# Patient Record
Sex: MALE | Race: WHITE | URBAN - METROPOLITAN AREA
[De-identification: names, ages, dates, MRNs, and addresses within clinical notes are randomized per-mention and may not be internally consistent; named-entity substitution may affect disease eponyms.]

---

## 2023-06-26 VITALS
SYSTOLIC BLOOD PRESSURE: 104 MMHG | OXYGEN SATURATION: 96 % | DIASTOLIC BLOOD PRESSURE: 44 MMHG | RESPIRATION RATE: 14 BRPM | RESPIRATION RATE: 6 BRPM | WEIGHT: 203 LBS | DIASTOLIC BLOOD PRESSURE: 92 MMHG | DIASTOLIC BLOOD PRESSURE: 67 MMHG | DIASTOLIC BLOOD PRESSURE: 67 MMHG | DIASTOLIC BLOOD PRESSURE: 73 MMHG | HEART RATE: 58 BPM | SYSTOLIC BLOOD PRESSURE: 104 MMHG | TEMPERATURE: 98.6 F | HEART RATE: 63 BPM | OXYGEN SATURATION: 98 % | TEMPERATURE: 98.6 F | OXYGEN SATURATION: 99 % | SYSTOLIC BLOOD PRESSURE: 141 MMHG | HEIGHT: 72 IN | RESPIRATION RATE: 21 BRPM | RESPIRATION RATE: 14 BRPM | SYSTOLIC BLOOD PRESSURE: 148 MMHG | DIASTOLIC BLOOD PRESSURE: 92 MMHG | DIASTOLIC BLOOD PRESSURE: 73 MMHG | SYSTOLIC BLOOD PRESSURE: 148 MMHG | DIASTOLIC BLOOD PRESSURE: 73 MMHG | RESPIRATION RATE: 13 BRPM | OXYGEN SATURATION: 96 % | DIASTOLIC BLOOD PRESSURE: 44 MMHG | DIASTOLIC BLOOD PRESSURE: 76 MMHG | DIASTOLIC BLOOD PRESSURE: 44 MMHG | SYSTOLIC BLOOD PRESSURE: 111 MMHG | OXYGEN SATURATION: 98 % | DIASTOLIC BLOOD PRESSURE: 86 MMHG | HEART RATE: 64 BPM | HEIGHT: 72 IN | HEART RATE: 58 BPM | RESPIRATION RATE: 6 BRPM | SYSTOLIC BLOOD PRESSURE: 111 MMHG | RESPIRATION RATE: 6 BRPM | DIASTOLIC BLOOD PRESSURE: 69 MMHG | HEART RATE: 72 BPM | OXYGEN SATURATION: 98 % | DIASTOLIC BLOOD PRESSURE: 76 MMHG | RESPIRATION RATE: 21 BRPM | RESPIRATION RATE: 13 BRPM | HEART RATE: 61 BPM | SYSTOLIC BLOOD PRESSURE: 104 MMHG | RESPIRATION RATE: 14 BRPM | HEART RATE: 58 BPM | DIASTOLIC BLOOD PRESSURE: 69 MMHG | SYSTOLIC BLOOD PRESSURE: 118 MMHG | SYSTOLIC BLOOD PRESSURE: 109 MMHG | SYSTOLIC BLOOD PRESSURE: 118 MMHG | HEART RATE: 63 BPM | HEART RATE: 61 BPM | RESPIRATION RATE: 13 BRPM | WEIGHT: 203 LBS | SYSTOLIC BLOOD PRESSURE: 141 MMHG | SYSTOLIC BLOOD PRESSURE: 109 MMHG | HEART RATE: 67 BPM | HEART RATE: 63 BPM | OXYGEN SATURATION: 99 % | SYSTOLIC BLOOD PRESSURE: 109 MMHG | DIASTOLIC BLOOD PRESSURE: 67 MMHG | DIASTOLIC BLOOD PRESSURE: 69 MMHG | SYSTOLIC BLOOD PRESSURE: 111 MMHG | OXYGEN SATURATION: 92 % | OXYGEN SATURATION: 92 % | HEART RATE: 61 BPM | HEART RATE: 72 BPM | RESPIRATION RATE: 21 BRPM | HEIGHT: 72 IN | DIASTOLIC BLOOD PRESSURE: 86 MMHG | HEART RATE: 67 BPM | DIASTOLIC BLOOD PRESSURE: 92 MMHG | DIASTOLIC BLOOD PRESSURE: 76 MMHG | TEMPERATURE: 98.6 F | HEART RATE: 67 BPM | DIASTOLIC BLOOD PRESSURE: 86 MMHG | HEART RATE: 72 BPM | HEART RATE: 64 BPM | SYSTOLIC BLOOD PRESSURE: 148 MMHG | SYSTOLIC BLOOD PRESSURE: 141 MMHG | OXYGEN SATURATION: 99 % | HEART RATE: 64 BPM | WEIGHT: 203 LBS | SYSTOLIC BLOOD PRESSURE: 118 MMHG | OXYGEN SATURATION: 92 % | OXYGEN SATURATION: 96 %

## 2023-06-28 ENCOUNTER — OFFICE (OUTPATIENT)
Dept: URBAN - METROPOLITAN AREA PATHOLOGY 2 | Facility: PATHOLOGY | Age: 63
End: 2023-06-28
Payer: COMMERCIAL

## 2023-06-28 ENCOUNTER — AMBULATORY SURGICAL CENTER (OUTPATIENT)
Dept: URBAN - METROPOLITAN AREA SURGERY 12 | Facility: SURGERY | Age: 63
End: 2023-06-28
Payer: COMMERCIAL

## 2023-06-28 DIAGNOSIS — Z85.72 PERSONAL HISTORY OF NON-HODGKIN LYMPHOMAS: ICD-10-CM

## 2023-06-28 DIAGNOSIS — K21.00 GASTRO-ESOPHAGEAL REFLUX DISEASE WITH ESOPHAGITIS, WITHOUT B: ICD-10-CM

## 2023-06-28 PROBLEM — K22.89 OTHER SPECIFIED DISEASE OF ESOPHAGUS: Status: ACTIVE | Noted: 2023-06-28

## 2023-06-28 PROCEDURE — 88313 SPECIAL STAINS GROUP 2: CPT | Performed by: PATHOLOGY

## 2023-06-28 PROCEDURE — 43239 EGD BIOPSY SINGLE/MULTIPLE: CPT | Performed by: INTERNAL MEDICINE

## 2023-06-28 PROCEDURE — 88305 TISSUE EXAM BY PATHOLOGIST: CPT | Performed by: PATHOLOGY

## 2023-06-28 PROCEDURE — 88342 IMHCHEM/IMCYTCHM 1ST ANTB: CPT | Performed by: PATHOLOGY

## 2023-07-14 LAB
ALANINE AMINOTRANSFERASE (SGPT) (U/L) IN SER/PLAS: 42 U/L (ref 10–52)
ALBUMIN (G/DL) IN SER/PLAS: 4.2 G/DL (ref 3.4–5)
ALKALINE PHOSPHATASE (U/L) IN SER/PLAS: 85 U/L (ref 33–136)
ANION GAP IN SER/PLAS: 12 MMOL/L (ref 10–20)
ASPARTATE AMINOTRANSFERASE (SGOT) (U/L) IN SER/PLAS: 37 U/L (ref 9–39)
BASOPHILS (10*3/UL) IN BLOOD BY AUTOMATED COUNT: 0.04 X10E9/L (ref 0–0.1)
BASOPHILS/100 LEUKOCYTES IN BLOOD BY AUTOMATED COUNT: 0.7 % (ref 0–2)
BILIRUBIN TOTAL (MG/DL) IN SER/PLAS: 0.6 MG/DL (ref 0–1.2)
CALCIUM (MG/DL) IN SER/PLAS: 9.1 MG/DL (ref 8.6–10.6)
CARBON DIOXIDE, TOTAL (MMOL/L) IN SER/PLAS: 30 MMOL/L (ref 21–32)
CHLORIDE (MMOL/L) IN SER/PLAS: 107 MMOL/L (ref 98–107)
CREATININE (MG/DL) IN SER/PLAS: 1.1 MG/DL (ref 0.5–1.3)
EOSINOPHILS (10*3/UL) IN BLOOD BY AUTOMATED COUNT: 0.28 X10E9/L (ref 0–0.7)
EOSINOPHILS/100 LEUKOCYTES IN BLOOD BY AUTOMATED COUNT: 4.7 % (ref 0–6)
ERYTHROCYTE DISTRIBUTION WIDTH (RATIO) BY AUTOMATED COUNT: 13.2 % (ref 11.5–14.5)
ERYTHROCYTE MEAN CORPUSCULAR HEMOGLOBIN CONCENTRATION (G/DL) BY AUTOMATED: 32.1 G/DL (ref 32–36)
ERYTHROCYTE MEAN CORPUSCULAR VOLUME (FL) BY AUTOMATED COUNT: 94 FL (ref 80–100)
ERYTHROCYTES (10*6/UL) IN BLOOD BY AUTOMATED COUNT: 5.11 X10E12/L (ref 4.5–5.9)
FERRITIN (UG/LL) IN SER/PLAS: 125 UG/L (ref 20–300)
GFR MALE: 76 ML/MIN/1.73M2
GLUCOSE (MG/DL) IN SER/PLAS: 104 MG/DL (ref 74–99)
HEMATOCRIT (%) IN BLOOD BY AUTOMATED COUNT: 48 % (ref 41–52)
HEMOGLOBIN (G/DL) IN BLOOD: 15.4 G/DL (ref 13.5–17.5)
IMMATURE GRANULOCYTES/100 LEUKOCYTES IN BLOOD BY AUTOMATED COUNT: 0.5 % (ref 0–0.9)
IRON (UG/DL) IN SER/PLAS: 67 UG/DL (ref 35–150)
IRON BINDING CAPACITY (UG/DL) IN SER/PLAS: 348 UG/DL (ref 240–445)
IRON SATURATION (%) IN SER/PLAS: 19 % (ref 25–45)
LACTATE (MMOL/L) IN SER/PLAS: 3.1 MMOL/L (ref 0.4–2)
LEUKOCYTES (10*3/UL) IN BLOOD BY AUTOMATED COUNT: 6 X10E9/L (ref 4.4–11.3)
LYMPHOCYTES (10*3/UL) IN BLOOD BY AUTOMATED COUNT: 0.97 X10E9/L (ref 1.2–4.8)
LYMPHOCYTES/100 LEUKOCYTES IN BLOOD BY AUTOMATED COUNT: 16.2 % (ref 13–44)
MONOCYTES (10*3/UL) IN BLOOD BY AUTOMATED COUNT: 0.7 X10E9/L (ref 0.1–1)
MONOCYTES/100 LEUKOCYTES IN BLOOD BY AUTOMATED COUNT: 11.7 % (ref 2–10)
NEUTROPHILS (10*3/UL) IN BLOOD BY AUTOMATED COUNT: 3.96 X10E9/L (ref 1.2–7.7)
NEUTROPHILS/100 LEUKOCYTES IN BLOOD BY AUTOMATED COUNT: 66.2 % (ref 40–80)
NRBC (PER 100 WBCS) BY AUTOMATED COUNT: 0 /100 WBC (ref 0–0)
PLATELETS (10*3/UL) IN BLOOD AUTOMATED COUNT: 221 X10E9/L (ref 150–450)
POTASSIUM (MMOL/L) IN SER/PLAS: 4.5 MMOL/L (ref 3.5–5.3)
PROTEIN TOTAL: 6.5 G/DL (ref 6.4–8.2)
SODIUM (MMOL/L) IN SER/PLAS: 144 MMOL/L (ref 136–145)
UREA NITROGEN (MG/DL) IN SER/PLAS: 23 MG/DL (ref 6–23)

## 2023-09-17 PROBLEM — R35.1 NOCTURIA: Status: ACTIVE | Noted: 2023-09-17

## 2023-09-17 PROBLEM — E89.0 HYPOTHYROIDISM, POSTSURGICAL: Status: ACTIVE | Noted: 2023-09-17

## 2023-09-17 PROBLEM — I10 HIGH BLOOD PRESSURE: Status: ACTIVE | Noted: 2023-09-17

## 2023-09-17 PROBLEM — R23.2 HOT FLASHES: Status: ACTIVE | Noted: 2023-09-17

## 2023-09-17 RX ORDER — VALSARTAN 160 MG/1
1 TABLET ORAL DAILY
COMMUNITY
End: 2023-10-31 | Stop reason: SDUPTHER

## 2023-09-17 RX ORDER — LEVOTHYROXINE SODIUM 137 UG/1
1 TABLET ORAL DAILY
COMMUNITY
Start: 2014-11-06 | End: 2023-11-03 | Stop reason: SDUPTHER

## 2023-10-12 ENCOUNTER — HOSPITAL ENCOUNTER (OUTPATIENT)
Dept: RADIOLOGY | Facility: CLINIC | Age: 63
Discharge: HOME | End: 2023-10-12
Payer: COMMERCIAL

## 2023-10-12 DIAGNOSIS — R94.02 ABNORMAL BRAIN SCAN: ICD-10-CM

## 2023-10-12 DIAGNOSIS — C88.4 EXTRANODAL MARGINAL ZONE B-CELL LYMPHOMA OF MUCOSA-ASSOCIATED LYMPHOID TISSUE (MALT-LYMPHOMA) (MULTI): ICD-10-CM

## 2023-10-12 PROCEDURE — 3430000001 HC RX 343 DIAGNOSTIC RADIOPHARMACEUTICALS: Performed by: INTERNAL MEDICINE

## 2023-10-12 PROCEDURE — 78815 PET IMAGE W/CT SKULL-THIGH: CPT | Mod: PET TUMOR SUBSQ TX STRATEGY | Performed by: NUCLEAR MEDICINE

## 2023-10-12 PROCEDURE — A9552 F18 FDG: HCPCS | Performed by: INTERNAL MEDICINE

## 2023-10-12 PROCEDURE — 78815 PET IMAGE W/CT SKULL-THIGH: CPT | Mod: PS

## 2023-10-12 PROCEDURE — 3430000001 HC RX 343 DIAGNOSTIC RADIOPHARMACEUTICALS: Mod: MUE

## 2023-10-12 PROCEDURE — A9552 F18 FDG: HCPCS | Mod: MUE

## 2023-10-12 RX ORDER — FLUDEOXYGLUCOSE F 18 200 MCI/ML
14.3 INJECTION, SOLUTION INTRAVENOUS
Status: COMPLETED | OUTPATIENT
Start: 2023-10-12 | End: 2023-10-12

## 2023-10-12 RX ADMIN — FLUDEOXYGLUCOSE F 18 14.3 MILLICURIE: 200 INJECTION, SOLUTION INTRAVENOUS at 08:28

## 2023-10-13 ASSESSMENT — PATIENT HEALTH QUESTIONNAIRE - PHQ9
5. POOR APPETITE OR OVEREATING: NOT AT ALL
4. FEELING TIRED OR HAVING LITTLE ENERGY: NOT AT ALL
1. LITTLE INTEREST OR PLEASURE IN DOING THINGS: NOT AT ALL
10. IF YOU CHECKED OFF ANY PROBLEMS, HOW DIFFICULT HAVE THESE PROBLEMS MADE IT FOR YOU TO DO YOUR WORK, TAKE CARE OF THINGS AT HOME, OR GET ALONG WITH OTHER PEOPLE: NOT DIFFICULT AT ALL
2. FEELING DOWN, DEPRESSED, IRRITABLE, OR HOPELESS: NOT AT ALL
3. TROUBLE FALLING OR STAYING ASLEEP OR SLEEPING TOO MUCH: NOT AT ALL
4. FEELING TIRED OR HAVING LITTLE ENERGY: NOT AT ALL
9. THOUGHTS THAT YOU WOULD BE BETTER OFF DEAD, OR OF HURTING YOURSELF: NOT AT ALL
10. IF YOU CHECKED OFF ANY PROBLEMS, HOW DIFFICULT HAVE THESE PROBLEMS MADE IT FOR YOU TO DO YOUR WORK, TAKE CARE OF THINGS AT HOME, OR GET ALONG WITH OTHER PEOPLE: NOT DIFFICULT AT ALL
8. MOVING OR SPEAKING SO SLOWLY THAT OTHER PEOPLE COULD HAVE NOTICED. OR THE OPPOSITE, BEING SO FIGETY OR RESTLESS THAT YOU HAVE BEEN MOVING AROUND A LOT MORE THAN USUAL: NOT AT ALL
2. FEELING DOWN, DEPRESSED, IRRITABLE, OR HOPELESS: 0
9. THOUGHTS THAT YOU WOULD BE BETTER OFF DEAD, OR OF HURTING YOURSELF: 0
3. TROUBLE FALLING OR STAYING ASLEEP OR SLEEPING TOO MUCH: NOT AT ALL
6. FEELING BAD ABOUT YOURSELF - OR THAT YOU ARE A FAILURE OR HAVE LET YOURSELF OR YOUR FAMILY DOWN: 0
8. MOVING OR SPEAKING SO SLOWLY THAT OTHER PEOPLE COULD HAVE NOTICED. OR THE OPPOSITE, BEING SO FIGETY OR RESTLESS THAT YOU HAVE BEEN MOVING AROUND A LOT MORE THAN USUAL: 0
8. MOVING OR SPEAKING SO SLOWLY THAT OTHER PEOPLE COULD HAVE NOTICED. OR THE OPPOSITE, BEING SO FIGETY OR RESTLESS THAT YOU HAVE BEEN MOVING AROUND A LOT MORE THAN USUAL: NOT AT ALL
1. LITTLE INTEREST OR PLEASURE IN DOING THINGS: 0
1. LITTLE INTEREST OR PLEASURE IN DOING THINGS: NOT AT ALL
6. FEELING BAD ABOUT YOURSELF - OR THAT YOU ARE A FAILURE OR HAVE LET YOURSELF OR YOUR FAMILY DOWN: NOT AT ALL
4. FEELING TIRED OR HAVING LITTLE ENERGY: 0
2. FEELING DOWN, DEPRESSED OR HOPELESS: NOT AT ALL
5. POOR APPETITE OR OVEREATING: 0
5. POOR APPETITE OR OVEREATING: NOT AT ALL
7. TROUBLE CONCENTRATING ON THINGS, SUCH AS READING THE NEWSPAPER OR WATCHING TELEVISION: 0
9. THOUGHTS THAT YOU WOULD BE BETTER OFF DEAD, OR OF HURTING YOURSELF: NOT AT ALL
SUM OF ALL RESPONSES TO PHQ QUESTIONS 1-9: 0
3. TROUBLE FALLING OR STAYING ASLEEP OR SLEEPING TOO MUCH: 0
7. TROUBLE CONCENTRATING ON THINGS, SUCH AS READING THE NEWSPAPER OR WATCHING TELEVISION: NOT AT ALL
SUM OF ALL RESPONSES TO PHQ QUESTIONS 1-9: 0
7. TROUBLE CONCENTRATING ON THINGS, SUCH AS READING THE NEWSPAPER OR WATCHING TELEVISION: NOT AT ALL
6. FEELING BAD ABOUT YOURSELF - OR THAT YOU ARE A FAILURE OR HAVE LET YOURSELF OR YOUR FAMILY DOWN: NOT AT ALL

## 2023-10-17 ENCOUNTER — OFFICE VISIT (OUTPATIENT)
Dept: HEMATOLOGY/ONCOLOGY | Facility: CLINIC | Age: 63
End: 2023-10-17
Payer: COMMERCIAL

## 2023-10-17 ENCOUNTER — APPOINTMENT (OUTPATIENT)
Dept: LAB | Facility: CLINIC | Age: 63
End: 2023-10-17
Payer: COMMERCIAL

## 2023-10-17 ENCOUNTER — DOCUMENTATION (OUTPATIENT)
Dept: HEMATOLOGY/ONCOLOGY | Facility: CLINIC | Age: 63
End: 2023-10-17

## 2023-10-17 VITALS
DIASTOLIC BLOOD PRESSURE: 97 MMHG | OXYGEN SATURATION: 96 % | HEIGHT: 72 IN | BODY MASS INDEX: 28.67 KG/M2 | HEART RATE: 55 BPM | SYSTOLIC BLOOD PRESSURE: 145 MMHG | TEMPERATURE: 97.3 F | RESPIRATION RATE: 18 BRPM | WEIGHT: 211.64 LBS

## 2023-10-17 DIAGNOSIS — E89.0 HYPOTHYROIDISM, POSTSURGICAL: ICD-10-CM

## 2023-10-17 DIAGNOSIS — C85.99 GASTRIC LYMPHOMA (MULTI): Primary | ICD-10-CM

## 2023-10-17 LAB
BASOPHILS # BLD AUTO: 0.02 X10*3/UL (ref 0–0.1)
BASOPHILS NFR BLD AUTO: 0.4 %
EOSINOPHIL # BLD AUTO: 0.19 X10*3/UL (ref 0–0.7)
EOSINOPHIL NFR BLD AUTO: 3.3 %
ERYTHROCYTE [DISTWIDTH] IN BLOOD BY AUTOMATED COUNT: 13 % (ref 11.5–14.5)
HCT VFR BLD AUTO: 46.7 % (ref 41–52)
HGB BLD-MCNC: 15.5 G/DL (ref 13.5–17.5)
IMM GRANULOCYTES # BLD AUTO: 0.02 X10*3/UL (ref 0–0.7)
IMM GRANULOCYTES NFR BLD AUTO: 0.4 % (ref 0–0.9)
LDH SERPL L TO P-CCNC: 152 U/L (ref 84–246)
LYMPHOCYTES # BLD AUTO: 1.1 X10*3/UL (ref 1.2–4.8)
LYMPHOCYTES NFR BLD AUTO: 19.3 %
MCH RBC QN AUTO: 31 PG (ref 26–34)
MCHC RBC AUTO-ENTMCNC: 33.2 G/DL (ref 32–36)
MCV RBC AUTO: 93 FL (ref 80–100)
MONOCYTES # BLD AUTO: 0.73 X10*3/UL (ref 0.1–1)
MONOCYTES NFR BLD AUTO: 12.8 %
NEUTROPHILS # BLD AUTO: 3.63 X10*3/UL (ref 1.2–7.7)
NEUTROPHILS NFR BLD AUTO: 63.8 %
NRBC BLD-RTO: ABNORMAL /100{WBCS}
PLATELET # BLD AUTO: 214 X10*3/UL (ref 150–450)
PMV BLD AUTO: 9.2 FL (ref 7.5–11.5)
RBC # BLD AUTO: 5 X10*6/UL (ref 4.5–5.9)
WBC # BLD AUTO: 5.7 X10*3/UL (ref 4.4–11.3)

## 2023-10-17 PROCEDURE — 3080F DIAST BP >= 90 MM HG: CPT | Performed by: INTERNAL MEDICINE

## 2023-10-17 PROCEDURE — 80053 COMPREHEN METABOLIC PANEL: CPT | Mod: CMCLAB | Performed by: INTERNAL MEDICINE

## 2023-10-17 PROCEDURE — 85025 COMPLETE CBC W/AUTO DIFF WBC: CPT | Performed by: INTERNAL MEDICINE

## 2023-10-17 PROCEDURE — 99214 OFFICE O/P EST MOD 30 MIN: CPT | Performed by: INTERNAL MEDICINE

## 2023-10-17 PROCEDURE — 36415 COLL VENOUS BLD VENIPUNCTURE: CPT | Performed by: INTERNAL MEDICINE

## 2023-10-17 PROCEDURE — 3077F SYST BP >= 140 MM HG: CPT | Performed by: INTERNAL MEDICINE

## 2023-10-17 PROCEDURE — 83615 LACTATE (LD) (LDH) ENZYME: CPT | Performed by: INTERNAL MEDICINE

## 2023-10-17 PROCEDURE — 1036F TOBACCO NON-USER: CPT | Performed by: INTERNAL MEDICINE

## 2023-10-17 PROCEDURE — 83615 LACTATE (LD) (LDH) ENZYME: CPT | Mod: CMCLAB | Performed by: INTERNAL MEDICINE

## 2023-10-17 RX ORDER — GLUCOSAMINE/CHONDRO SU A 500-400 MG
TABLET ORAL DAILY
COMMUNITY
End: 2023-10-31 | Stop reason: ALTCHOICE

## 2023-10-17 RX ORDER — BISMUTH SUBSALICYLATE 262 MG
1 TABLET,CHEWABLE ORAL DAILY
COMMUNITY

## 2023-10-17 RX ORDER — GLUCOSAM/CHONDRO/HERB 149/HYAL 750-100 MG
TABLET ORAL
COMMUNITY

## 2023-10-17 ASSESSMENT — PAIN SCALES - GENERAL: PAINLEVEL: 0-NO PAIN

## 2023-10-17 NOTE — PROGRESS NOTES
ASSESSMENT, PROBLEM LIST, DECISION MAKING, PLAN.    MALT lymphoma of stomach, diagnosed in January 2023, stage I E  EGD by Dr. Lucio Mendez showed 2 cm hiatal hernia moderate erythema of the body and several 2 mm erosion which was biopsied and showed atypical lymphoid infiltrate suggestive of MALT lymphoma,, H. pylori negative.  Lymphocytes were CD20 positive, Bcl-2 positive and coexpression of CD43, repeat H. pylori was negative as well   Additional studies for B-cell gene rearrangement and FISH analysis for t(11:18) translocation may be helpful so specimen has been sent to [facility].    Bone marrow aspirate / biopsy is negative At [facility], PET scan showed only activity in the stomach and additional activity in the right palatine tonsil of unclear etiology.    Patient had right tonsil biopsy showed focal chronic inflammation, no evidence of malignancy, right base of the tongue biopsy showed some chronic inflammation but no malignancy, by Dr. Jose Logan on 8 March 2023.      Patient had radiation to stomach started on 1 March 2023 and ended on 20 March 2023, he had 14 treatment with total dose of 25.2 Gy under care of Dr. Pagan at [facility]    Patient had a follow-up EGD by Dr. Lucio Mendez on 28 June 2023 and had normal stomach at that time.  Net PET was negative in October 2023      PAST MEDICAL HISTORY:     hypertension, hypothyroidism post thyroidectomy 15 years prior      INTERVAL HISTORY :   Patient returns today for follow-up after patient finished radiation therapy for MALT lymphoma of the stomach in March 2023  Patient had a follow-up EGD by Dr. Lucio Mendez on 28 June 2023 and had normal stomach at that time.  Patient had a net PET done and now returns today for follow-up and review of testing.  Patient is clinically asymptomatic denies any fever weight loss night sweats or abdominal pain.  Denies any new lymphadenopathy.      PHYSICAL EXAM:  General: Conscious, alert, oriented x3,  not in acute distress.  HEENT:    No icterus.    Chest:Bilateral symmetrical,     CVS:  S1, S2.    Abdomen:  Soft, no palpable mass   Extremities: No clubbing, cyanosis,     Skin: No petechial rash.        Patient had net PET done on 12 October 2023 showed no abnormal activity suggestive of recurrence or persistent disease.      ASSESSMENT & PLAN:    Patient  with H. pylori negative MALT lymphoma involving stomach stage I E.  Staging PET scan was independently reviewed and reviewed with the patient and wife shows heterogeneous activity in the stomach, no other distant lymphadenopathy noted except borderline activity on right palatine tonsil, on physical exam he has mild erythema in his throat but otherwise I do not see any obvious mass.  Could very well be reactive versus second primary and less likely related to his MALT lymphoma.  Bone marrow biopsy is negative for involvement with lymphoma.       There was a second opinion from [facility] pathology department and B-cell gene rearrangement was also positive, consistent with B-cell lymphoma       Patient had right tonsil biopsy showed focal chronic inflammation, no evidence of malignancy, right base of the tongue biopsy showed some chronic inflammation but no malignancy, by Dr. Jose Logan on 8 March 2023.      Patient had radiation to stomach started on 1 March 2023 and ended on 20 March 2023, he had 14 treatment with total dose of 25.2 [place] under care of Dr. Pagan at [facility]  Patient had EGD on June 28, 2023 by Dr. Lucio Mendez and endoscopically there was no mass identified.        Net PET is negative in October 2023,  Clinically no evidence of recurrence, and he does not have any B symptoms of fever weight loss or night sweats,  For now we will continue watchful waiting  We will do periodic lab works and will now return for follow-up in 4 months.    Discussed with patient and wife.    Medication reviewed in e-chart  Patient is monitored  for medication toxicity  labs reviewed and interpreted independently, X rays independently reviewed  Notes from other physicians involved in care were reviewed        Charting was completed using voice recognition technology and may include unintended errors.    IVETTE ARREOLA MD, ADENIKE.    [facility] Master Clinician in Hematology and Oncology  Medical Director, [facility] at [facility].    [place] office    Phone [phone]  Fax      [phone]    [facility] /[place].    Phone [phone]  Fax     [phone]

## 2023-10-17 NOTE — PROGRESS NOTES
Pt seen by Dr. Murillo in clinic today.  Pt to have labs drawn today and have FUV in 4 mos with labs prior.  Provided information to gain access on My Chart. Patient verbalizes understanding of plan of care via teachback method.

## 2023-10-18 LAB
ALBUMIN SERPL BCP-MCNC: 4.5 G/DL (ref 3.4–5)
ALP SERPL-CCNC: 73 U/L (ref 33–136)
ALT SERPL W P-5'-P-CCNC: 31 U/L (ref 10–52)
ANION GAP SERPL CALC-SCNC: 16 MMOL/L (ref 10–20)
AST SERPL W P-5'-P-CCNC: 28 U/L (ref 9–39)
BILIRUB SERPL-MCNC: 0.7 MG/DL (ref 0–1.2)
BUN SERPL-MCNC: 20 MG/DL (ref 6–23)
CALCIUM SERPL-MCNC: 9.5 MG/DL (ref 8.6–10.6)
CHLORIDE SERPL-SCNC: 106 MMOL/L (ref 98–107)
CO2 SERPL-SCNC: 27 MMOL/L (ref 21–32)
CREAT SERPL-MCNC: 1 MG/DL (ref 0.5–1.3)
GFR SERPL CREATININE-BSD FRML MDRD: 85 ML/MIN/1.73M*2
GLUCOSE SERPL-MCNC: 88 MG/DL (ref 74–99)
POTASSIUM SERPL-SCNC: 4.9 MMOL/L (ref 3.5–5.3)
PROT SERPL-MCNC: 6.9 G/DL (ref 6.4–8.2)
SODIUM SERPL-SCNC: 144 MMOL/L (ref 136–145)

## 2023-10-31 ENCOUNTER — OFFICE VISIT (OUTPATIENT)
Dept: PRIMARY CARE | Facility: CLINIC | Age: 63
End: 2023-10-31
Payer: COMMERCIAL

## 2023-10-31 VITALS
HEART RATE: 66 BPM | WEIGHT: 210.4 LBS | DIASTOLIC BLOOD PRESSURE: 93 MMHG | OXYGEN SATURATION: 96 % | HEIGHT: 72 IN | BODY MASS INDEX: 28.5 KG/M2 | SYSTOLIC BLOOD PRESSURE: 132 MMHG | TEMPERATURE: 96.9 F

## 2023-10-31 DIAGNOSIS — Z00.00 ROUTINE MEDICAL EXAM: ICD-10-CM

## 2023-10-31 DIAGNOSIS — I10 HYPERTENSION, UNSPECIFIED TYPE: Primary | ICD-10-CM

## 2023-10-31 DIAGNOSIS — E89.0 POSTOPERATIVE HYPOTHYROIDISM: ICD-10-CM

## 2023-10-31 DIAGNOSIS — Z12.5 PROSTATE CANCER SCREENING: ICD-10-CM

## 2023-10-31 DIAGNOSIS — C88.4 MALT (MUCOSA ASSOCIATED LYMPHOID TISSUE) (MULTI): ICD-10-CM

## 2023-10-31 PROBLEM — C88.40 MALT (MUCOSA ASSOCIATED LYMPHOID TISSUE): Status: ACTIVE | Noted: 2023-10-31

## 2023-10-31 PROBLEM — R35.1 NOCTURIA: Status: RESOLVED | Noted: 2023-09-17 | Resolved: 2023-10-31

## 2023-10-31 PROBLEM — Z90.89 STATUS POST TONSILLECTOMY: Status: RESOLVED | Noted: 2023-03-15 | Resolved: 2023-10-31

## 2023-10-31 PROBLEM — Z90.89 STATUS POST TONSILLECTOMY: Status: ACTIVE | Noted: 2023-03-15

## 2023-10-31 PROBLEM — R23.2 HOT FLASHES: Status: RESOLVED | Noted: 2023-09-17 | Resolved: 2023-10-31

## 2023-10-31 PROCEDURE — 99204 OFFICE O/P NEW MOD 45 MIN: CPT | Performed by: NURSE PRACTITIONER

## 2023-10-31 PROCEDURE — 3080F DIAST BP >= 90 MM HG: CPT | Performed by: NURSE PRACTITIONER

## 2023-10-31 PROCEDURE — 3075F SYST BP GE 130 - 139MM HG: CPT | Performed by: NURSE PRACTITIONER

## 2023-10-31 PROCEDURE — 1036F TOBACCO NON-USER: CPT | Performed by: NURSE PRACTITIONER

## 2023-10-31 RX ORDER — GLUCOSAMINE/CHONDRO SU A 500-400 MG
1 TABLET ORAL DAILY
COMMUNITY

## 2023-10-31 RX ORDER — VALSARTAN 160 MG/1
160 TABLET ORAL DAILY
Qty: 90 TABLET | Refills: 3 | Status: SHIPPED | OUTPATIENT
Start: 2023-10-31 | End: 2024-10-30

## 2023-10-31 ASSESSMENT — ENCOUNTER SYMPTOMS
EYE ITCHING: 0
NERVOUS/ANXIOUS: 0
ABDOMINAL DISTENTION: 0
WOUND: 0
DYSURIA: 0
ABDOMINAL PAIN: 0
DIARRHEA: 0
ACTIVITY CHANGE: 0
PALPITATIONS: 0
FREQUENCY: 0
SHORTNESS OF BREATH: 0
EYE PAIN: 0
VOMITING: 0
ARTHRALGIAS: 0
WHEEZING: 0
APPETITE CHANGE: 0
HEMATURIA: 0
CONSTIPATION: 0
NUMBNESS: 1
COUGH: 0

## 2023-10-31 ASSESSMENT — PAIN SCALES - GENERAL: PAINLEVEL: 0-NO PAIN

## 2023-10-31 NOTE — PROGRESS NOTES
Braxton Coello male   1960 63 y.o.   [id number]    Chief Complaint  Establish Care (Previous saw dr. Liang, annual visit, medication refilled ).    History Of Present Illness  Du Coello is a 63 y.o. male presents today to establish care.   HPI    Acute concerns today:     Chronic conditions reviewed:   MALT Lymphoma   Oncologist Dr Brock -  [place].  Completed radiation. No chemo.  Endoscopy in June. PET Scan recently -  all resolved.     Nocturia  Resolved.      Postoperative hypothyroid   Had enlarged thyroid- therefore it was removed. Benign goiter  Well managed on levothyroxine which he takes on empty stomach (usually in middle of night when he wakes up).      Review of Systems  Review of Systems   Constitutional:  Negative for activity change and appetite change.   HENT:  Negative for congestion.    Eyes:  Negative for pain and itching.   Respiratory:  Negative for cough, shortness of breath and wheezing.    Cardiovascular:  Negative for chest pain, palpitations and leg swelling.   Gastrointestinal:  Negative for abdominal distention, abdominal pain, constipation, diarrhea and vomiting.   Genitourinary:  Negative for dysuria, frequency, hematuria and urgency.   Musculoskeletal:  Negative for arthralgias and gait problem.   Skin:  Negative for rash and wound.   Neurological:  Positive for numbness (exacerbated by working out-- goes to chiropractor.).   Psychiatric/Behavioral:  The patient is not nervous/anxious.        Diet: No diets   Exercise: Gym 4 days per week, Golfing 1 day per week, at least 10 K steps per day.     Dental: Dentist in [place] - Twice per year  Ophtho: None at this time     Screenings:  Colon cancer screening - Cologuard 2021 - due in 2024   Immunizations:   Flu- declines today   COVID vaccines and boosters-   Tdap- recommended     Shingles- recommended      ESTEFANY Fry)     Past Medical History  He has a past medical history of Hypothyroidism,  unspecified (11/12/2013) and Status post tonsillectomy (03/15/2023).    Surgical History  He has a past surgical history that includes Tonsillectomy and Thyroidectomy.    Family History  Family History   Problem Relation Name Age of Onset    Goiter Mother          Total Thyroidectomy    Pancreatic cancer Mother      Other (Cigarette use) Father      Heart attack Father      Thyroid cancer Sister      Heart attack Sister          Social History  He reports that he has never smoked. He has never used smokeless tobacco. He reports that he does not drink alcohol and does not use drugs.    Allergies  Patient has no known allergies.    Medications  Current Outpatient Medications   Medication Instructions    glucosamine-chondroitin 500-400 mg tablet 1 tablet, oral, Daily    levothyroxine (Synthroid, Levoxyl) 137 mcg tablet 1 tablet, oral, Daily    multivitamin tablet 1 tablet, oral, Daily    omega 3-dha-epa-fish oil (Fish OiL) 1,000 mg (120 mg-180 mg) capsule oral    valsartan (DIOVAN) 160 mg, oral, Daily, For blood pressure        Objective   BP (!) 132/93   Pulse 66   Temp 36.1 °C (96.9 °F) (Temporal)   Ht 1.829 m (6')   Wt 95.4 kg (210 lb 6.4 oz)   SpO2 96%   BMI 28.54 kg/m²    BMI: Estimated body mass index is 28.54 kg/m² as calculated from the following:    Height as of this encounter: 1.829 m (6').    Weight as of this encounter: 95.4 kg (210 lb 6.4 oz).    Physical Exam  Physical Exam  Vitals and nursing note reviewed.   Constitutional:       Appearance: Normal appearance.   HENT:      Head: Normocephalic and atraumatic.      Nose: Nose normal.      Mouth/Throat:      Mouth: Mucous membranes are moist.      Pharynx: Oropharynx is clear.   Eyes:      Extraocular Movements: Extraocular movements intact.      Conjunctiva/sclera: Conjunctivae normal.      Pupils: Pupils are equal, round, and reactive to light.   Cardiovascular:      Rate and Rhythm: Normal rate and regular rhythm.      Pulses: Normal pulses.       Heart sounds: Normal heart sounds.   Pulmonary:      Effort: Pulmonary effort is normal.      Breath sounds: Normal breath sounds.   Abdominal:      General: Bowel sounds are normal.      Palpations: Abdomen is soft.      Tenderness: There is no abdominal tenderness.   Musculoskeletal:         General: Normal range of motion.      Cervical back: Neck supple.   Skin:     General: Skin is warm and dry.   Neurological:      General: No focal deficit present.      Mental Status: He is alert and oriented to person, place, and time. Mental status is at baseline.   Psychiatric:         Mood and Affect: Mood normal.         Behavior: Behavior normal.         Thought Content: Thought content normal.         Judgment: Judgment normal.         Relevant Results and Imaging  Office Visit on 10/17/2023   Component Date Value Ref Range Status    WBC 10/17/2023 5.7  4.4 - 11.3 x10*3/uL Final    nRBC 10/17/2023    Final    Not Measured    RBC 10/17/2023 5.00  4.50 - 5.90 x10*6/uL Final    Hemoglobin 10/17/2023 15.5  13.5 - 17.5 g/dL Final    Hematocrit 10/17/2023 46.7  41.0 - 52.0 % Final    MCV 10/17/2023 93  80 - 100 fL Final    MCH 10/17/2023 31.0  26.0 - 34.0 pg Final    MCHC 10/17/2023 33.2  32.0 - 36.0 g/dL Final    RDW 10/17/2023 13.0  11.5 - 14.5 % Final    Platelets 10/17/2023 214  150 - 450 x10*3/uL Final    MPV 10/17/2023 9.2  7.5 - 11.5 fL Final    Neutrophils % 10/17/2023 63.8  40.0 - 80.0 % Final    Immature Granulocytes %, Automated 10/17/2023 0.4  0.0 - 0.9 % Final    Immature Granulocyte Count (IG) includes promyelocytes, myelocytes and metamyelocytes but does not include bands. Percent differential counts (%) should be interpreted in the context of the absolute cell counts (cells/UL).    Lymphocytes % 10/17/2023 19.3  13.0 - 44.0 % Final    Monocytes % 10/17/2023 12.8  2.0 - 10.0 % Final    Eosinophils % 10/17/2023 3.3  0.0 - 6.0 % Final    Basophils % 10/17/2023 0.4  0.0 - 2.0 % Final    Neutrophils Absolute  10/17/2023 3.63  1.20 - 7.70 x10*3/uL Final    Percent differential counts (%) should be interpreted in the context of the absolute cell counts (cells/uL).    Immature Granulocytes Absolute, Au* 10/17/2023 0.02  0.00 - 0.70 x10*3/uL Final    Lymphocytes Absolute 10/17/2023 1.10 (L)  1.20 - 4.80 x10*3/uL Final    Monocytes Absolute 10/17/2023 0.73  0.10 - 1.00 x10*3/uL Final    Eosinophils Absolute 10/17/2023 0.19  0.00 - 0.70 x10*3/uL Final    Basophils Absolute 10/17/2023 0.02  0.00 - 0.10 x10*3/uL Final    Glucose 10/17/2023 88  74 - 99 mg/dL Final    Sodium 10/17/2023 144  136 - 145 mmol/L Final    Potassium 10/17/2023 4.9  3.5 - 5.3 mmol/L Final    Chloride 10/17/2023 106  98 - 107 mmol/L Final    Bicarbonate 10/17/2023 27  21 - 32 mmol/L Final    Anion Gap 10/17/2023 16  10 - 20 mmol/L Final    Urea Nitrogen 10/17/2023 20  6 - 23 mg/dL Final    Creatinine 10/17/2023 1.00  0.50 - 1.30 mg/dL Final    eGFR 10/17/2023 85  >60 mL/min/1.73m*2 Final    Calculations of estimated GFR are performed using the 2021 CKD-EPI Study Refit equation without the race variable for the IDMS-Traceable creatinine methods.  https://jasn.asnjournals.org/content/early/2021/09/22/ASN.3145613762    Calcium 10/17/2023 9.5  8.6 - 10.6 mg/dL Final    Albumin 10/17/2023 4.5  3.4 - 5.0 g/dL Final    Alkaline Phosphatase 10/17/2023 73  33 - 136 U/L Final    Total Protein 10/17/2023 6.9  6.4 - 8.2 g/dL Final    AST 10/17/2023 28  9 - 39 U/L Final    Bilirubin, Total 10/17/2023 0.7  0.0 - 1.2 mg/dL Final    ALT 10/17/2023 31  10 - 52 U/L Final    Patients treated with Sulfasalazine may generate falsely decreased results for ALT.    LDH 10/17/2023 152  84 - 246 U/L Final   Orders Only on 07/14/2023   Component Date Value Ref Range Status    Glucose 07/14/2023 104 (H)  74 - 99 mg/dL Final    Sodium 07/14/2023 144  136 - 145 mmol/L Final    Potassium 07/14/2023 4.5  3.5 - 5.3 mmol/L Final    Chloride 07/14/2023 107  98 - 107 mmol/L Final     Bicarbonate 07/14/2023 30  21 - 32 mmol/L Final    Anion Gap 07/14/2023 12  10 - 20 mmol/L Final    Urea Nitrogen 07/14/2023 23  6 - 23 mg/dL Final    Creatinine 07/14/2023 1.10  0.50 - 1.30 mg/dL Final    GFR MALE 07/14/2023 76  >90 mL/min/1.73m2 Final    Comment:  CALCULATIONS OF ESTIMATED GFR ARE PERFORMED   USING THE 2021 CKD-EPI STUDY REFIT EQUATION   WITHOUT THE RACE VARIABLE FOR THE IDMS-TRACEABLE   CREATININE METHODS.    https://jasn.asnjournals.org/content/early/2021/09/22/ASN.0963293569      Calcium 07/14/2023 9.1  8.6 - 10.6 mg/dL Final    Albumin 07/14/2023 4.2  3.4 - 5.0 g/dL Final    Alkaline Phosphatase 07/14/2023 85  33 - 136 U/L Final    Total Protein 07/14/2023 6.5  6.4 - 8.2 g/dL Final    AST 07/14/2023 37  9 - 39 U/L Final    Total Bilirubin 07/14/2023 0.6  0.0 - 1.2 mg/dL Final    ALT (SGPT) 07/14/2023 42  10 - 52 U/L Final    Comment:  Patients treated with Sulfasalazine may generate    falsely decreased results for ALT.      Lactate 07/14/2023 3.1 (H)  0.4 - 2.0 mmol/L Final    Comment:  Venipuncture immediately after or during the    administration of Metamizole may lead to falsely   low results. Testing should be performed immediately   prior to Metamizole dosing.      WBC 07/14/2023 6.0  4.4 - 11.3 x10E9/L Final    nRBC 07/14/2023 0.0  0.0 - 0.0 /100 WBC Final    RBC 07/14/2023 5.11  4.50 - 5.90 x10E12/L Final    Hemoglobin 07/14/2023 15.4  13.5 - 17.5 g/dL Final    Hematocrit 07/14/2023 48.0  41.0 - 52.0 % Final    MCV 07/14/2023 94  80 - 100 fL Final    MCHC 07/14/2023 32.1  32.0 - 36.0 g/dL Final    Platelets 07/14/2023 221  150 - 450 x10E9/L Final    RDW 07/14/2023 13.2  11.5 - 14.5 % Final    Neutrophils % 07/14/2023 66.2  40.0 - 80.0 % Final    Immature Granulocytes %, Automated 07/14/2023 0.5  0.0 - 0.9 % Final    Comment:  Immature Granulocyte Count (IG) includes promyelocytes,    myelocytes and metamyelocytes but does not include bands.   Percent differential counts (%) should be  "interpreted in the   context of the absolute cell counts (cells/L).      Lymphocytes % 07/14/2023 16.2  13.0 - 44.0 % Final    Monocytes % 07/14/2023 11.7  2.0 - 10.0 % Final    Eosinophils % 07/14/2023 4.7  0.0 - 6.0 % Final    Basophils % 07/14/2023 0.7  0.0 - 2.0 % Final    Neutrophils Absolute 07/14/2023 3.96  1.20 - 7.70 x10E9/L Final    Lymphocytes Absolute 07/14/2023 0.97 (L)  1.20 - 4.80 x10E9/L Final    Monocytes Absolute 07/14/2023 0.70  0.10 - 1.00 x10E9/L Final    Eosinophils Absolute 07/14/2023 0.28  0.00 - 0.70 x10E9/L Final    Basophils Absolute 07/14/2023 0.04  0.00 - 0.10 x10E9/L Final    Ferritin 07/14/2023 125  20 - 300 ug/L Final    Iron 07/14/2023 67  35 - 150 ug/dL Final    TIBC 07/14/2023 348  240 - 445 ug/dL Final    Iron Saturation 07/14/2023 19 (L)  25 - 45 % Final   [facility] Encounter on 01/27/2023   Component Date Value Ref Range Status    Pathology Report 01/27/2023    Final                    Value:Name EDUARDO HERNANDEZ                                                                                                   Accession #: [id number]            Pathologist:                   DEMETRIA BEE JR, MD, PhD.  Date of Procedure:    1/27/2023  Date Received:          1/27/2023  Date Reported           2/20/2023  Submitting Physician:   IVETTE ARREOLA MD  Location:                    [facility]  Other External # [id number]                                                                 FINAL DIAGNOSIS  A.  \"GASTRIC BIOPSIES\", ([facility] [id number] (12/29/2022)):     -- SCANT FRAGMENTS OF GASTRIC MUCOSA WITH AN ATYPICAL B-LYMPHOID INFILTRATE  SUSPICIOUS FOR A LOW GRADE B CELL LYMPHOMA, SEE NOTE    NOTE: The lymphoid infiltrate is B cell dominant and molecular studies (see  full addended report) was positive for  clonal B cell receptor gene  rearrangement. An extranodal marginal zone lymphoma is favored but the sample  is small. Clinical correlation is recommended. Findings commu          "                 nicated to Dr. Robbie Murillo by email on 2/17/2023.    MORPHOLOGY:  The specimen is composed of small fragments of gastric mucosa with  a dense monomorphic lymphoid infiltrate composed of small sized lymphoid cells  with associated mucosal expansion and effacement. Lymphoepithelial lesions are  not seen. No increased plasma cells are noted and no paula bodies are  identified. Follicles are not appreciated. No H. pylori organisms were  identified morphologically (reviewed by Dr. Camilla Fernando, gastrointestinal  pathologist, [facility]).      IMMUNOHISTOCHEMISTRY:  An H. pylori stain was performed at the outside institution and reviewed at  [facility]. Although no organisms were identified, no positive control slide was  provided.     Immunohistochemical stains were performed on block S22-7127-3 received from the  outside institution and demonstrated only a limited amount of the lymphoid  infiltrate remaining in the block. Immunostains revealed the infiltrate to be  predominantly composed                           of CD20+, BCL2+ B cells, negative for CD5, CD10, BCL6,  Cyclin D1 and Sox11. CD3 and CD5 highlight background fewer T cells. CD21  highlights increased follicular dendritic cell meshwork staining without the  presence of CD10+/BCL6+ germinal center cells. Kappa and Lambda in situ  hybridization highlights few scattered polytypic plasma cells in the lamina  propria. H. pylori staining was not repeated.          FLOW CYTOMETRY: Not performed or information not provided  MOLECULAR STUDIES: Positive for clonal B cell receptor gene rearrangement. See  full addended report.                                                                                                                                                                                                                                                                                                                                                                                                                                                                                                                      Electronically Signed Out By DEMETRIA BEE JR, MD, PhD./KATELYN  By the signature on this report, the individual or group listed as making the  Final Interpretation/Diagnosis certifies that they have reviewed this case.           Addendum/Procedures:  Special Oncology Report     Date Ordered:     2/15/2023     Status:  Signed Out       Date Complete:     2/15/2023             Date Reported:     2/15/2023             Addendum Diagnosis  TEST: B-cell receptor clonality  SPECIMEN: gastric core biopsy [id number] A1 (outside path no. [id number])  COLLECTION DATE: 01/27/2023  RECEIVED DATE: 02/07/2023  REPORT DATE: 02/15/2023        RESULT:    POSITIVE. CLONAL POPULATION DETECTED in the tested sample.    INTERPRETATION:    In the proper clinical context, detection of a clonal IGH gene rearrangement  supports the diagnosis of a clonal lymphoproliferative process such as leukemia  or lymphoma. However, physiologic clonal populations may be detected                           in some  reactive conditions and an overall low number of IGH-rearranged lymphocytes  present in the sample material may lead to false positive results due to  preferential amplification. In addition, clonal rearrangements are not always  lineage specific (e.g. B cell receptor loci may be rearranged in  lymphoproliferative processes of T cell origin). The results of this assay  should be interpreted in the context of clinical, histopathologic, and other  laboratory findings.        DISCLAIMER:    Limitations:  This method is designed to detect clonal rearrangements of the IGH locus and  will not detect rearrangements in other immunoglobulin loci. Detection of a  clone at or above 2.5% of total mapped reads does not equate to a diagnosis of  malignancy; benign clonal processes are not  differentiated from malignant  clonal processes on this assay. Polyclonal results do not exclude the  possibility of a clonal IGH rearrangement present below the limit of detection  of the assay or present                           in other immunoglobulin gene loci. In addition, false  negative results can occur due to mutations in the primer-binding sites in the  IGHV or IGHJ genes. This assay is not designed to detect minimal/measurable  residual disease.    Methodology:  Genomic DNA was extracted from the specimen provided and the IGHV gene region  located on chromosome 14q was amplified by PCR using primers to the IGHV FR1  region and the IGHJ region followed by analysis of PCR products by  next-generation sequencing. Clonal populations were detected by sequence  grouping and comparison according to sequence and V and J allele usage. Genome  assembly (hg19) was used for alignment. The limit of detection of this assay  has been determined to be 2.5% of total mapped reads.    Nucleic acid extraction and testing are performed in the  Translational  Laboratory ([facility]) located at [address] [place] [address]  (CLIA [id number], CAP [id number]).    This laboratory developed test was developed and its                           analytical performance  characteristics have been determined by  Translational Laboratory. This test  has not been cleared or approved by the FDA; however, the FDA has determined  that such approval is not necessary. The [facility] is certified under the Clinical  Laboratory Improvement Amendments of 1988 (CLIA-88) as qualified to perform  high complexity testing.           Electronically Signed Out By MARY CONNOLLY MD/KAUSHIK  By the signature on this report, the individual or group listed as making the  Final Interpretation/Diagnosis certifies that they have reviewed this case.       Clinical History:  None Provided     Specimens Submitted As:  A: [facility] [id number]  "(12/29/2022)       Other Case Numbers   [id number]  Slide/Block Description  RECEIVED FROM [facility], DEPT OF PATH, [address] [place] [address], ARE 2 SLIDES AND 1 BLOCK LABELED [id number]    Keep Slides:     N     Slides Returned:     N  Personal Consult:     N    The assays/tests were performed                           with appropriate positive and negative controls  which stained appropriately.One or more of the reagents used to perform assays  on this specimen MAY have contained components considered to be Laboratory  Developed Tests (LDT).  LDT's have not been cleared or approved by the U.S.  Food and Drug Administration.  These assays/tests were developed and their  performance characteristics determined by the Department of Pathology  Immunohistochemistry Labs at [facility]. The FDA does  not require this test to go through premarket FDA review. This test is used for  clinical purposes. It should not be regarded as investigational or for  research. This laboratory is certified under the Clinical Laboratory  Improvement Amendments (CLIA) as qualified to perform high complexity clinical  laboratory testing.  The assays/tests were performed with appropriate positive  and negative controls which stained appropriately.        CONVERTED FINAL DIAGNOSIS 01/27/2023    Final                    Value:A.  \"GASTRIC BIOPSIES\", ([facility] [id number] (12/29/2022)):     -- SCANT FRAGMENTS OF GASTRIC MUCOSA WITH AN ATYPICAL B-LYMPHOID INFILTRATE  SUSPICIOUS FOR A LOW GRADE B CELL LYMPHOMA, SEE NOTE    NOTE: The lymphoid infiltrate is B cell dominant and molecular studies (see  full addended report) was positive for  clonal B cell receptor gene  rearrangement. An extranodal marginal zone lymphoma is favored but the sample  is small. Clinical correlation is recommended. Findings communicated to Dr. Robbie Murillo by email on 2/17/2023.    MORPHOLOGY:  The specimen is composed of " small fragments of gastric mucosa with  a dense monomorphic lymphoid infiltrate composed of small sized lymphoid cells  with associated mucosal expansion and effacement. Lymphoepithelial lesions are  not seen. No increased plasma cells are noted and no paula bodies are  identified. Follicles are not appreciated. No H. pylori organisms were  identified morphologically (reviewed by Dr. Camilla Fernando, gastroin                          testinal  pathologist, [facility]).      IMMUNOHISTOCHEMISTRY:  An H. pylori stain was performed at the outside institution and reviewed at  [facility]. Although no organisms were identified, no positive control slide was  provided.     Immunohistochemical stains were performed on block [id number] received from the  outside institution and demonstrated only a limited amount of the lymphoid  infiltrate remaining in the block. Immunostains revealed the infiltrate to be  predominantly composed of CD20+, BCL2+ B cells, negative for CD5, CD10, BCL6,  Cyclin D1 and Sox11. CD3 and CD5 highlight background fewer T cells. CD21  highlights increased follicular dendritic cell meshwork staining without the  presence of CD10+/BCL6+ germinal center cells. Kappa and Lambda in situ  hybridization highlights few scattered polytypic plasma cells in the lamina  propria. H. pylori staining was not repeated.          FLOW CYTOMETRY: Not performed or information not provided  MOLECULAR STUDIES: Positive for clonal B                           cell receptor gene rearrangement. See  full addended report.            CONVERTED CLINICAL DIAGNOSIS-HISTO* 01/27/2023 None Provided   Final    CONVERTED SLIDE-BLOCK DESCRIPTION 01/27/2023    Final                    Value:RECEIVED FROM [facility], DEPT OF PATH, [address] [place] [address], ARE 2 SLIDES AND 1 BLOCK LABELED [id number]      CONVERTED ADDENDUM DIAGNOSIS 01/27/2023    Final                    Value:TEST: B-cell receptor clonality  SPECIMEN:  gastric core biopsy [id number] A1 (outside path no. [id number])  COLLECTION DATE: 01/27/2023  RECEIVED DATE: 02/07/2023  REPORT DATE: 02/15/2023        RESULT:    POSITIVE. CLONAL POPULATION DETECTED in the tested sample.    INTERPRETATION:    In the proper clinical context, detection of a clonal IGH gene rearrangement  supports the diagnosis of a clonal lymphoproliferative process such as leukemia  or lymphoma. However, physiologic clonal populations may be detected in some  reactive conditions and an overall low number of IGH-rearranged lymphocytes  present in the sample material may lead to false positive results due to  preferential amplification. In addition, clonal rearrangements are not always  lineage specific (e.g. B cell receptor loci may be rearranged in  lymphoproliferative processes of T cell origin). The results of this assay  should be interpreted in the context of clinical, histopathologic, and other  laboratory findings.        DIS                          CLAIMER:    Limitations:  This method is designed to detect clonal rearrangements of the IGH locus and  will not detect rearrangements in other immunoglobulin loci. Detection of a  clone at or above 2.5% of total mapped reads does not equate to a diagnosis of  malignancy; benign clonal processes are not differentiated from malignant  clonal processes on this assay. Polyclonal results do not exclude the  possibility of a clonal IGH rearrangement present below the limit of detection  of the assay or present in other immunoglobulin gene loci. In addition, false  negative results can occur due to mutations in the primer-binding sites in the  IGHV or IGHJ genes. This assay is not designed to detect minimal/measurable  residual disease.    Methodology:  Genomic DNA was extracted from the specimen provided and the IGHV gene region  located on chromosome 14q was amplified by PCR using primers to the IGHV FR1  region and the IGHJ region followed by analysis of  PCR products by  next-generation s                          equencing. Clonal populations were detected by sequence  grouping and comparison according to sequence and V and J allele usage. Genome  assembly (hg19) was used for alignment. The limit of detection of this assay  has been determined to be 2.5% of total mapped reads.    Nucleic acid extraction and testing are performed in the  Translational  Laboratory ([facility]) located at [address] [place] [address]  (CLIA [id number], CAP [id number]).    This laboratory developed test was developed and its analytical performance  characteristics have been determined by  [facility]. This test  has not been cleared or approved by the FDA; however, the FDA has determined  that such approval is not necessary. The [facility] is certified under the Clinical  Laboratory Improvement Amendments of 1988 (CLIA-88) as qualified to perform  high complexity testing.        CONVERTED FINAL REPORT PDF LINK TO* 01/27/2023 \\copathshare\copath\PDF 2022_Feb\[id number]   Final   Legacy Encounter on 01/20/2023   Component Date Value Ref Range Status    Hepatitis B Core Total Ab 01/20/2023 NONREACTIVE  NONREACTIVE Final    Comment:  Results from patients taking biotin supplements or receiving   high-dose biotin therapy should be interpreted with caution   due to possible interference with this test. Providers may   contact their local laboratory for further information.      HIV 1 and 2 Screen 01/20/2023 NONREACTIVE  NONREACTIVE Final    Comment:  HIV Ag/Ab screen is performed using the Siemens Curious SensellePub Direct   HIV Ag/Ab Combo assay which detects the presence of HIV    p24 antigen as well as antibodies to HIV-1   (Group M and O) and HIV-2.  .  No laboratory evidence of HIV infection. If acute HIV infection is   suspected, consider testing for HIV RNA by PCR (viral load).      Hepatitis C Ab 01/20/2023 NONREACTIVE  NONREACTIVE Final    Comment:  Results from patients  taking biotin supplements or receiving   high-dose biotin therapy should be interpreted with caution   due to possible interference with this test. Providers may    contact their local laboratory for further information.      LD 01/20/2023 140  84 - 246 U/L Final    Glucose 01/20/2023 86  74 - 99 mg/dL Final    Sodium 01/20/2023 142  136 - 145 mmol/L Final    Potassium 01/20/2023 5.0  3.5 - 5.3 mmol/L Final    Chloride 01/20/2023 103  98 - 107 mmol/L Final    Bicarbonate 01/20/2023 32  21 - 32 mmol/L Final    Anion Gap 01/20/2023 12  10 - 20 mmol/L Final    Urea Nitrogen 01/20/2023 23  6 - 23 mg/dL Final    Creatinine 01/20/2023 1.04  0.50 - 1.30 mg/dL Final    GFR MALE 01/20/2023 81  >90 mL/min/1.73m2 Final    Comment:  CALCULATIONS OF ESTIMATED GFR ARE PERFORMED   USING THE 2021 CKD-EPI STUDY REFIT EQUATION   WITHOUT THE RACE VARIABLE FOR THE IDMS-TRACEABLE   CREATININE METHODS.    https://jasn.asnjournals.org/content/early/2021/09/22/ASN.6227335024      Calcium 01/20/2023 9.6  8.6 - 10.6 mg/dL Final    Albumin 01/20/2023 4.4  3.4 - 5.0 g/dL Final    Alkaline Phosphatase 01/20/2023 71  33 - 136 U/L Final    Total Protein 01/20/2023 6.8  6.4 - 8.2 g/dL Final    AST 01/20/2023 22  9 - 39 U/L Final    Total Bilirubin 01/20/2023 0.7  0.0 - 1.2 mg/dL Final    ALT (SGPT) 01/20/2023 23  10 - 52 U/L Final    Comment:  Patients treated with Sulfasalazine may generate    falsely decreased results for ALT.      WBC 01/20/2023 6.8  4.4 - 11.3 x10E9/L Final    RBC 01/20/2023 5.27  4.50 - 5.90 x10E12/L Final    Hemoglobin 01/20/2023 15.6  13.5 - 17.5 g/dL Final    Hematocrit 01/20/2023 47.7  41.0 - 52.0 % Final    MCV 01/20/2023 91  80 - 100 fL Final    MCHC 01/20/2023 32.7  32.0 - 36.0 g/dL Final    Platelets 01/20/2023 253  150 - 450 x10E9/L Final    RDW 01/20/2023 13.9  11.5 - 14.5 % Final    Neutrophils % 01/20/2023 63.0  40.0 - 80.0 % Final    Immature Granulocytes %, Automated 01/20/2023 0.1  0.0 - 0.9 % Final    Comment:   Immature Granulocyte Count (IG) includes promyelocytes,    myelocytes and metamyelocytes but does not include bands.   Percent differential counts (%) should be interpreted in the   context of the absolute cell counts (cells/L).      Lymphocytes % 01/20/2023 23.3  13.0 - 44.0 % Final    Monocytes % 01/20/2023 10.4  2.0 - 10.0 % Final    Eosinophils % 01/20/2023 2.8  0.0 - 6.0 % Final    Basophils % 01/20/2023 0.4  0.0 - 2.0 % Final    Neutrophils Absolute 01/20/2023 4.28  1.20 - 7.70 x10E9/L Final    Comment:  Percent differential counts (%) should be interpreted in the   context of the absolute cell counts (cells/L).      Lymphocytes Absolute 01/20/2023 1.59  1.20 - 4.80 x10E9/L Final    Monocytes Absolute 01/20/2023 0.71  0.10 - 1.00 x10E9/L Final    Eosinophils Absolute 01/20/2023 0.19  0.00 - 0.70 x10E9/L Final    Basophils Absolute 01/20/2023 0.03  0.00 - 0.10 x10E9/L Final    Hepatitis B Surface Ag 01/20/2023 NONREACTIVE  NONREACTIVE Final    Comment:  Biotin interference may cause falsely decreased results.   Patients taking a Biotin dose of up to 5 mg/day should   refrain from taking Biotin for 24 hours before sample   collection. Providers may contact their local laboratory   for further information.     Legacy Encounter on 11/17/2022   Component Date Value Ref Range Status    Albumin 11/17/2022 4.4  3.4 - 5.0 g/dL Final    Total Bilirubin 11/17/2022 0.6  0.0 - 1.2 mg/dL Final    Bilirubin, Direct 11/17/2022 0.1  0.0 - 0.3 mg/dL Final    Alkaline Phosphatase 11/17/2022 70  33 - 136 U/L Final    ALT (SGPT) 11/17/2022 19  10 - 52 U/L Final    Comment:  Patients treated with Sulfasalazine may generate    falsely decreased results for ALT.      AST 11/17/2022 20  9 - 39 U/L Final    Total Protein 11/17/2022 6.9  6.4 - 8.2 g/dL Final    Glucose 11/17/2022 93  74 - 99 mg/dL Final    Sodium 11/17/2022 139  136 - 145 mmol/L Final    Potassium 11/17/2022 4.3  3.5 - 5.3 mmol/L Final    Chloride 11/17/2022 100  98 -  107 mmol/L Final    Bicarbonate 11/17/2022 30  21 - 32 mmol/L Final    Anion Gap 11/17/2022 13  10 - 20 mmol/L Final    Urea Nitrogen 11/17/2022 15  6 - 23 mg/dL Final    Creatinine 11/17/2022 1.28  0.50 - 1.30 mg/dL Final    GFR MALE 11/17/2022 63  >90 mL/min/1.73m2 Final    Comment:  CALCULATIONS OF ESTIMATED GFR ARE PERFORMED   USING THE 2021 CKD-EPI STUDY REFIT EQUATION   WITHOUT THE RACE VARIABLE FOR THE IDMS-TRACEABLE   CREATININE METHODS.    https://jasn.asnjournals.org/content/early/2021/09/22/ASN.6578744046      Calcium 11/17/2022 9.1  8.6 - 10.6 mg/dL Final    TSH 11/17/2022 0.67  0.44 - 3.98 mIU/L Final    Comment:  TSH testing is performed using different testing    methodology at [facility] than at other    [facility]. Direct result comparisons should    only be made within the same method.      Cholesterol 11/17/2022 135  0 - 199 mg/dL Final    Comment: .      AGE      DESIRABLE   BORDERLINE HIGH   HIGH     0-19 Y     0 - 169       170 - 199     >/= 200    20-24 Y     0 - 189       190 - 224     >/= 225         >24 Y     0 - 199       200 - 239     >/= 240   **All ranges are based on fasting samples. Specific   therapeutic targets will vary based on patient-specific   cardiac risk.  .   Pediatric guidelines reference:Pediatrics 2011, 128(S5).   Adult guidelines reference: NCEP ATPIII Guidelines,     ALINE 2001, 258:2486-97  .   Venipuncture immediately after or during the    administration of Metamizole may lead to falsely   low results. Testing should be performed immediately   prior to Metamizole dosing.      HDL 11/17/2022 48.2  mg/dL Final    Comment: .      AGE      VERY LOW   LOW     NORMAL    HIGH       0-19 Y       < 35   < 40     40-45     ----    20-24 Y       ----   < 40       >45     ----      >24 Y       ----   < 40     40-60      >60  .      Cholesterol/HDL Ratio 11/17/2022 2.8   Final    Comment: REF VALUES  DESIRABLE  < 3.4  HIGH RISK  > 5.0      LDL 11/17/2022 68  0  - 99 mg/dL Final    Comment: .                           NEAR      BORD      AGE      DESIRABLE  OPTIMAL    HIGH     HIGH     VERY HIGH     0-19 Y     0 - 109     ---    110-129   >/= 130     ----    20-24 Y     0 - 119     ---    120-159   >/= 160     ----      >24 Y     0 -  99   100-129  130-159   160-189     >/=190  .      VLDL 11/17/2022 19  0 - 40 mg/dL Final    Triglycerides 11/17/2022 95  0 - 149 mg/dL Final    Comment: .      AGE      DESIRABLE   BORDERLINE HIGH   HIGH     VERY HIGH   0 D-90 D    19 - 174         ----         ----        ----  91 D- 9 Y     0 -  74        75 -  99     >/= 100      ----    10-19 Y     0 -  89        90 - 129     >/= 130      ----    20-24 Y     0 - 114       115 - 149     >/= 150      ----         >24 Y     0 - 149       150 - 199    200- 499    >/= 500  .   Venipuncture immediately after or during the    administration of Metamizole may lead to falsely   low results. Testing should be performed immediately   prior to Metamizole dosing.       10/12/23 PET CT Lymphoma  IMPRESSION:  1. Similar nonspecific heterogeneous mild hypermetabolic activity  throughout a similarly thickened proximal gastric body and cardia.  Although the activity level suggests physiologic activity and the  wall thickening can be explained by persistent gastric  underdistention, residual viable disease can not be excluded. Direct  visualization is recommended.  2. No evidence of hypermetabolic locoregional or distant metastatic  disease.    Assessment and Plan  Assessment/Plan   Problem List Items Addressed This Visit             ICD-10-CM    Hypertensive disorder - Primary I10     -started on Diovan approx 1 year ago  -fairly well managed, occasionally elevated  -will monitor Bps at home x 7 days and report values          Relevant Medications    valsartan (Diovan) 160 mg tablet    Other Relevant Orders    Lipid Panel    CT cardiac scoring wo IV contrast    Follow [facility] - [facility]  Maintenance    Postoperative hypothyroidism E89.0     -well managed on levothyroxine   -TSH today   -once results received will send in Levothyroxine script          Relevant Orders    Tsh With Reflex To Free T4 If Abnormal    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

## 2023-10-31 NOTE — ASSESSMENT & PLAN NOTE
-started on Diovan approx 1 year ago  -fairly well managed, occasionally elevated  -will monitor Bps at home x 7 days and report values

## 2023-10-31 NOTE — ASSESSMENT & PLAN NOTE
-well managed on levothyroxine   -TSH today   -once results received will send in Levothyroxine script

## 2023-11-01 ENCOUNTER — LAB (OUTPATIENT)
Dept: LAB | Facility: LAB | Age: 63
End: 2023-11-01
Payer: COMMERCIAL

## 2023-11-01 DIAGNOSIS — Z00.00 ROUTINE MEDICAL EXAM: ICD-10-CM

## 2023-11-01 DIAGNOSIS — Z12.5 PROSTATE CANCER SCREENING: ICD-10-CM

## 2023-11-01 DIAGNOSIS — E89.0 POSTOPERATIVE HYPOTHYROIDISM: ICD-10-CM

## 2023-11-01 DIAGNOSIS — I10 HYPERTENSION, UNSPECIFIED TYPE: ICD-10-CM

## 2023-11-01 LAB
25(OH)D3 SERPL-MCNC: 41 NG/ML (ref 30–100)
CHOLEST SERPL-MCNC: 161 MG/DL (ref 0–199)
CHOLESTEROL/HDL RATIO: 3.4
HDLC SERPL-MCNC: 47.9 MG/DL
LDLC SERPL CALC-MCNC: 88 MG/DL
NON HDL CHOLESTEROL: 113 MG/DL (ref 0–149)
PSA SERPL-MCNC: 2.25 NG/ML
TRIGL SERPL-MCNC: 124 MG/DL (ref 0–149)
TSH SERPL-ACNC: 3.36 MIU/L (ref 0.44–3.98)
VLDL: 25 MG/DL (ref 0–40)

## 2023-11-01 PROCEDURE — 80061 LIPID PANEL: CPT

## 2023-11-01 PROCEDURE — 84153 ASSAY OF PSA TOTAL: CPT

## 2023-11-01 PROCEDURE — 36415 COLL VENOUS BLD VENIPUNCTURE: CPT

## 2023-11-01 PROCEDURE — 82306 VITAMIN D 25 HYDROXY: CPT

## 2023-11-01 PROCEDURE — 84443 ASSAY THYROID STIM HORMONE: CPT

## 2023-11-03 DIAGNOSIS — E89.0 POSTOPERATIVE HYPOTHYROIDISM: Primary | ICD-10-CM

## 2023-11-03 RX ORDER — LEVOTHYROXINE SODIUM 137 UG/1
137 TABLET ORAL DAILY
Qty: 90 TABLET | Refills: 3 | Status: SHIPPED | OUTPATIENT
Start: 2023-11-03 | End: 2024-11-02

## 2023-11-07 ENCOUNTER — APPOINTMENT (OUTPATIENT)
Dept: PRIMARY CARE | Facility: CLINIC | Age: 63
End: 2023-11-07
Payer: COMMERCIAL

## 2024-01-10 ENCOUNTER — ANCILLARY PROCEDURE (OUTPATIENT)
Dept: RADIOLOGY | Facility: CLINIC | Age: 64
End: 2024-01-10

## 2024-01-10 DIAGNOSIS — I10 HYPERTENSION, UNSPECIFIED TYPE: ICD-10-CM

## 2024-01-10 PROCEDURE — 75571 CT HRT W/O DYE W/CA TEST: CPT

## 2024-02-13 ENCOUNTER — LAB (OUTPATIENT)
Dept: LAB | Facility: CLINIC | Age: 64
End: 2024-02-13
Payer: COMMERCIAL

## 2024-02-13 DIAGNOSIS — C85.99 GASTRIC LYMPHOMA (MULTI): ICD-10-CM

## 2024-02-13 LAB
ALBUMIN SERPL BCP-MCNC: 4.2 G/DL (ref 3.4–5)
ALP SERPL-CCNC: 74 U/L (ref 33–136)
ALT SERPL W P-5'-P-CCNC: 31 U/L (ref 10–52)
ANION GAP SERPL CALC-SCNC: 15 MMOL/L (ref 10–20)
AST SERPL W P-5'-P-CCNC: 32 U/L (ref 9–39)
BASOPHILS # BLD AUTO: 0.02 X10*3/UL (ref 0–0.1)
BASOPHILS NFR BLD AUTO: 0.4 %
BILIRUB SERPL-MCNC: 0.7 MG/DL (ref 0–1.2)
BUN SERPL-MCNC: 22 MG/DL (ref 6–23)
CALCIUM SERPL-MCNC: 9.4 MG/DL (ref 8.6–10.6)
CHLORIDE SERPL-SCNC: 109 MMOL/L (ref 98–107)
CO2 SERPL-SCNC: 26 MMOL/L (ref 21–32)
CREAT SERPL-MCNC: 0.99 MG/DL (ref 0.5–1.3)
EGFRCR SERPLBLD CKD-EPI 2021: 86 ML/MIN/1.73M*2
EOSINOPHIL # BLD AUTO: 0.24 X10*3/UL (ref 0–0.7)
EOSINOPHIL NFR BLD AUTO: 4.7 %
ERYTHROCYTE [DISTWIDTH] IN BLOOD BY AUTOMATED COUNT: 13.3 % (ref 11.5–14.5)
GLUCOSE SERPL-MCNC: 91 MG/DL (ref 74–99)
HCT VFR BLD AUTO: 47.3 % (ref 41–52)
HGB BLD-MCNC: 15.4 G/DL (ref 13.5–17.5)
IMM GRANULOCYTES # BLD AUTO: 0.01 X10*3/UL (ref 0–0.7)
IMM GRANULOCYTES NFR BLD AUTO: 0.2 % (ref 0–0.9)
LDH SERPL L TO P-CCNC: 146 U/L (ref 84–246)
LYMPHOCYTES # BLD AUTO: 1.1 X10*3/UL (ref 1.2–4.8)
LYMPHOCYTES NFR BLD AUTO: 21.4 %
MCH RBC QN AUTO: 30.4 PG (ref 26–34)
MCHC RBC AUTO-ENTMCNC: 32.6 G/DL (ref 32–36)
MCV RBC AUTO: 93 FL (ref 80–100)
MONOCYTES # BLD AUTO: 0.57 X10*3/UL (ref 0.1–1)
MONOCYTES NFR BLD AUTO: 11.1 %
NEUTROPHILS # BLD AUTO: 3.19 X10*3/UL (ref 1.2–7.7)
NEUTROPHILS NFR BLD AUTO: 62.2 %
NRBC BLD-RTO: ABNORMAL /100{WBCS}
PLATELET # BLD AUTO: 218 X10*3/UL (ref 150–450)
POTASSIUM SERPL-SCNC: 4.7 MMOL/L (ref 3.5–5.3)
PROT SERPL-MCNC: 6.6 G/DL (ref 6.4–8.2)
RBC # BLD AUTO: 5.07 X10*6/UL (ref 4.5–5.9)
SODIUM SERPL-SCNC: 145 MMOL/L (ref 136–145)
WBC # BLD AUTO: 5.1 X10*3/UL (ref 4.4–11.3)

## 2024-02-13 PROCEDURE — 80053 COMPREHEN METABOLIC PANEL: CPT

## 2024-02-13 PROCEDURE — 83615 LACTATE (LD) (LDH) ENZYME: CPT

## 2024-02-13 PROCEDURE — 85025 COMPLETE CBC W/AUTO DIFF WBC: CPT

## 2024-02-13 PROCEDURE — 36415 COLL VENOUS BLD VENIPUNCTURE: CPT

## 2024-02-14 PROBLEM — C88.40 MUCOSA-ASSOCIATED LYMPHOID TISSUE (MALT) LYMPHOMA: Status: ACTIVE | Noted: 2023-01-27

## 2024-02-14 PROBLEM — C88.4: Status: ACTIVE | Noted: 2023-01-27

## 2024-02-14 PROBLEM — Z12.11 SPECIAL SCREENING FOR MALIGNANT NEOPLASM OF COLON: Status: ACTIVE | Noted: 2023-10-12

## 2024-02-14 PROBLEM — E03.9 HYPOTHYROIDISM: Status: ACTIVE | Noted: 2024-02-14

## 2024-02-14 PROBLEM — I10 HYPERTENSION: Status: ACTIVE | Noted: 2023-09-17

## 2024-02-14 PROBLEM — R10.9 ABDOMINAL PAIN: Status: ACTIVE | Noted: 2022-11-22

## 2024-02-16 ENCOUNTER — OFFICE VISIT (OUTPATIENT)
Dept: HEMATOLOGY/ONCOLOGY | Facility: CLINIC | Age: 64
End: 2024-02-16
Payer: COMMERCIAL

## 2024-02-16 VITALS
DIASTOLIC BLOOD PRESSURE: 87 MMHG | SYSTOLIC BLOOD PRESSURE: 138 MMHG | HEART RATE: 62 BPM | TEMPERATURE: 98.1 F | BODY MASS INDEX: 28.85 KG/M2 | OXYGEN SATURATION: 96 % | WEIGHT: 212.74 LBS

## 2024-02-16 DIAGNOSIS — E89.0 HYPOTHYROIDISM, POSTSURGICAL: ICD-10-CM

## 2024-02-16 DIAGNOSIS — C88.4 MALT (MUCOSA ASSOCIATED LYMPHOID TISSUE) (MULTI): Primary | ICD-10-CM

## 2024-02-16 PROCEDURE — 1036F TOBACCO NON-USER: CPT | Performed by: INTERNAL MEDICINE

## 2024-02-16 PROCEDURE — 3075F SYST BP GE 130 - 139MM HG: CPT | Performed by: INTERNAL MEDICINE

## 2024-02-16 PROCEDURE — 99214 OFFICE O/P EST MOD 30 MIN: CPT | Performed by: INTERNAL MEDICINE

## 2024-02-16 PROCEDURE — 3079F DIAST BP 80-89 MM HG: CPT | Performed by: INTERNAL MEDICINE

## 2024-02-16 ASSESSMENT — PAIN SCALES - GENERAL: PAINLEVEL: 0-NO PAIN

## 2024-02-16 NOTE — PROGRESS NOTES
"Patient ID: Braxton Coello \"Comfort" is a 64 y.o. male.  Referring Physician: Robbie Murillo MD  [address] [facility], [place] [address]  Primary Care Provider: CARLOS Lo    ORDERS & PATIENT INSTRUCTIONS:            ASSESSMENT, PROBLEM LIST, DECISION MAKING, PLAN.    MALT lymphoma of stomach, diagnosed in January 2023, stage I E  EGD by Dr. Lucio Mendez showed 2 cm hiatal hernia moderate erythema of the body and several 2 mm erosion which was biopsied and showed atypical lymphoid infiltrate suggestive of MALT lymphoma,, H. pylori negative.  Lymphocytes were CD20 positive, Bcl-2 positive and coexpression of CD43, repeat H. pylori was negative as well   Additional studies for B-cell gene rearrangement and FISH analysis for t(11:18) translocation may be helpful so specimen has been sent to [facility].    Bone marrow aspirate / biopsy is negative At [facility], PET scan showed only activity in the stomach and additional activity in the right palatine tonsil of unclear etiology.    Patient had right tonsil biopsy showed focal chronic inflammation, no evidence of malignancy, right base of the tongue biopsy showed some chronic inflammation but no malignancy, by Dr. Jose Logan on 8 March 2023.      Patient had radiation to stomach started on 1 March 2023 and ended on 20 March 2023, he had 14 treatment with total dose of 25.2 Gy under care of Dr. Pagan at [facility]    Patient had a follow-up EGD by Dr. Lucio Mendez on 28 June 2023 and had normal stomach at that time.  Net PET was negative in October 2023      PAST MEDICAL HISTORY:     hypertension, hypothyroidism post thyroidectomy 15 years prior      INTERVAL HISTORY :   Patient returns today for follow-up after patient finished radiation therapy for MALT lymphoma of the stomach in March 2023  Patient had a follow-up EGD by Dr. Lucio Mendez on 28 June 2023 and had normal stomach at that time.  Patient had a " negative PET scan in October 2023  Currently he is asymptomatic denies any fever weight loss night sweats or abdominal pain, denies any new lymphadenopathy.      PHYSICAL EXAM:  General: Conscious, alert, oriented x3, not in acute distress.  HEENT:    No icterus.    Chest:Bilateral symmetrical,     CVS:  S1, S2.    Abdomen:  Soft, no palpable mass   Extremities: No clubbing, cyanosis,     Skin: No petechial rash.        Patient had net PET done on 12 October 2023 showed no abnormal activity suggestive of recurrence or persistent disease.      ASSESSMENT & PLAN:    Patient  with H. pylori negative MALT lymphoma involving stomach stage I E.  Staging PET scan was independently reviewed and reviewed with the patient and wife shows heterogeneous activity in the stomach, no other distant lymphadenopathy noted except borderline activity on right palatine tonsil, on physical exam he has mild erythema in his throat but otherwise I do not see any obvious mass.  Could very well be reactive versus second primary and less likely related to his MALT lymphoma.  Bone marrow biopsy is negative for involvement with lymphoma.       There was a second opinion from [facility] pathology department and B-cell gene rearrangement was also positive, consistent with B-cell lymphoma       Patient had right tonsil biopsy showed focal chronic inflammation, no evidence of malignancy, right base of the tongue biopsy showed some chronic inflammation but no malignancy, by Dr. Jose Logan on 8 March 2023.      Patient had radiation to stomach started on 1 March 2023 and ended on 20 March 2023, he had 14 treatment with total dose of 25.2 Contreras under care of Dr. Pagan at [facility]  Patient had EGD on June 28, 2023 by Dr. Lucio Mendez and endoscopically there was no mass identified.    Patient had negative PET scan in October 2023.  His CBC CMP and LDH is normal, he does not have any B symptoms of fever weight loss night sweats,  "clinical there is no evidence of recurrence, continue watchful waiting and will now return for follow-up in 4 months.        Discussed with patient and wife.      VITALS:   2.21 meters squared /87   Pulse 62   Temp 36.7 °C (98.1 °F) (Temporal)   Wt 96.5 kg (212 lb 11.9 oz)   SpO2 96%   BMI 28.85 kg/m²     LABS:    CBC:  Recent Labs     02/13/24  0807 10/17/23  1140 07/14/23  0813 01/20/23  0952   WBC 5.1 5.7 6.0 6.8   HGB 15.4 15.5 15.4 15.6   HCT 47.3 46.7 48.0 47.7    214 221 253   MCV 93 93 94 91       CMP:  Recent Labs     02/13/24  0807 10/17/23  1140 07/14/23  0813 01/20/23  0952 11/17/22  0925    144 144 142 139   K 4.7 4.9 4.5 5.0 4.3   * 106 107 103 100   CO2 26 27 30 32 30   ANIONGAP 15 16 12 12 13   BUN 22 20 23 23 15   CREATININE 0.99 1.00 1.10 1.04 1.28   EGFR 86 85  --   --   --      Recent Labs     02/13/24  0807 10/17/23  1140 07/14/23  0813 01/20/23  0952 11/17/22  0925   ALBUMIN 4.2 4.5 4.2 4.4 4.4   ALKPHOS 74 73 85 71 70   ALT 31 31 42 23 19   AST 32 28 37 22 20   BILITOT 0.7 0.7 0.6 0.7 0.6       HEME/ENDO:  Recent Labs     11/01/23  0834 07/14/23  0813 11/17/22  0925 11/18/21  0916 11/13/20  0912   FERRITIN  --  125  --   --   --    IRONSAT  --  19*  --   --   --    TSH 3.36  --  0.67 0.42* 0.78        MICRO: No results for input(s): \"ESR\", \"CRP\", \"PROCAL\" in the last 04605 hours.  No results found for the last 90 days.        TUMOR MARKERS:  PSA (ng/mL)   Date Value   11/18/2021 1.37   11/13/2020 1.33                IMAGING:         CT cardiac scoring wo IV contrast  Narrative: Interpreted By:  Bhupinder Graham,   STUDY:  CT CARDIAC SCORING WO IV CONTRAST;  1/10/2024 8:35 am      INDICATION:  Signs/Symptoms:screening test,  htn.      COMPARISON:  None.      ACCESSION NUMBER(S):  [id number]      ORDERING CLINICIAN:  ROBBY SOL      TECHNIQUE:  Using prospective ECG gating, CT scan of the coronary arteries was  performed without intravenous contrast. Coronary " "calcium scoring  was  performed according to the method of Agatston.      FINDINGS:  The score and distribution of calcium in the coronary arteries is as  follows:      LM 0  LAD 0  LCx 0  RCA 0      Total 0      The visualized mid/lower ascending thoracic aorta measures 3.96 cm in  diameter. The heart is normal in size. No pericardial effusion is  present.      No gross evidence of mediastinal or hilar lymphadenopathy or masses  is identified. The visualized segments of the lungs are normally  expanded.      The visualized subdiaphragmatic structures appear intact.      Impression: 1. Coronary artery calcium score of 0*.      *Coronary artery calcium scoring may be helpful in predicting the  risk for future coronary heart disease events.  According to the  American College of Cardiology Foundation Clinical Expert Consensus  Task Force, such testing provides important prognostic information in  patients with more than one coronary heart disease risk factor. The  coronary artery calcium score correlates with the annual risk of a  non-fatal myocardial infarction or coronary heart disease death.      Coronary artery score            Annual Risk      0-99                             0.4%  100-399                        1.3%  >400                            2.4%      These three \"breakpoints\" correspond to lower, intermediate and high  risk states for future coronary events.  Such information should be  used, along with appropriate clinical judgment, to make decisions  regarding the intensity of risk factor management strategies to treat  blood lipids and to modify other non-lipid coronary risk factors.      Reference: [place] P et al. Circulation.  2007; 115:402-426      MACRO:  None      Signed by: Bhupinder Graham 1/10/2024 12:14 PM  Dictation workstation:   [id number]       Current Outpatient Medications   Medication Sig Dispense Refill    glucosamine-chondroitin 500-400 mg tablet Take 1 tablet by mouth early in " the morning..      levothyroxine (Synthroid, Levoxyl) 137 mcg tablet Take 1 tablet (137 mcg) by mouth once daily. 90 tablet 3    multivitamin tablet Take 1 tablet by mouth once daily.      omega 3-dha-epa-fish oil (Fish OiL) 1,000 mg (120 mg-180 mg) capsule Take by mouth.      valsartan (Diovan) 160 mg tablet Take 1 tablet (160 mg) by mouth once daily. For blood pressure 90 tablet 3     No current facility-administered medications for this visit.            FAMILY HISTORY:   Family History   Problem Relation Name Age of Onset    Goiter Mother          Total Thyroidectomy    Pancreatic cancer Mother      Other (Cigarette use) Father      Heart attack Father      Thyroid cancer Sister      Heart attack Sister          ALLERGY: Grass pollen    SOCIAL HISTORY: He  reports no history of alcohol use. He  reports no history of drug use.            Medication reviewed in e-chart  Patient is monitored for medication toxicity  labs reviewed and interpreted independently, X rays independently reviewed  Notes from other physicians involved in care were reviewed    Charting was completed using voice recognition technology and may include unintended errors.    IVETTE ARREOLA MD, ADENIKE.    [facility] Master Clinician in Hematology and Oncology  Medical Director, [facility] at [facility].  [place] office  Phone [phone]  Fax      [phone]  [facility] /[place].  Phone [phone]  Fax     [phone]

## 2024-02-16 NOTE — PROGRESS NOTES
Patient not on active treatment.  Follow up in 4 months with labs prior.  Patient independently ambulatory off unit in NAD and without complaints.  Gait steady.  Call back instructions reviewed.  Patient verbalized understanding.

## 2024-06-18 ENCOUNTER — APPOINTMENT (OUTPATIENT)
Dept: LAB | Facility: CLINIC | Age: 64
End: 2024-06-18
Payer: COMMERCIAL

## 2024-06-18 ENCOUNTER — LAB (OUTPATIENT)
Dept: LAB | Facility: CLINIC | Age: 64
End: 2024-06-18
Payer: COMMERCIAL

## 2024-06-18 DIAGNOSIS — C88.4 MALT (MUCOSA ASSOCIATED LYMPHOID TISSUE) (MULTI): ICD-10-CM

## 2024-06-18 DIAGNOSIS — E89.0 HYPOTHYROIDISM, POSTSURGICAL: ICD-10-CM

## 2024-06-18 LAB
BASOPHILS # BLD AUTO: 0.03 X10*3/UL (ref 0–0.1)
BASOPHILS NFR BLD AUTO: 0.5 %
EOSINOPHIL # BLD AUTO: 0.39 X10*3/UL (ref 0–0.7)
EOSINOPHIL NFR BLD AUTO: 6.5 %
ERYTHROCYTE [DISTWIDTH] IN BLOOD BY AUTOMATED COUNT: 13 % (ref 11.5–14.5)
HCT VFR BLD AUTO: 46.7 % (ref 41–52)
HGB BLD-MCNC: 15.6 G/DL (ref 13.5–17.5)
IMM GRANULOCYTES # BLD AUTO: 0.03 X10*3/UL (ref 0–0.7)
IMM GRANULOCYTES NFR BLD AUTO: 0.5 % (ref 0–0.9)
LYMPHOCYTES # BLD AUTO: 1.33 X10*3/UL (ref 1.2–4.8)
LYMPHOCYTES NFR BLD AUTO: 22.2 %
MCH RBC QN AUTO: 30.5 PG (ref 26–34)
MCHC RBC AUTO-ENTMCNC: 33.4 G/DL (ref 32–36)
MCV RBC AUTO: 91 FL (ref 80–100)
MONOCYTES # BLD AUTO: 0.62 X10*3/UL (ref 0.1–1)
MONOCYTES NFR BLD AUTO: 10.3 %
NEUTROPHILS # BLD AUTO: 3.6 X10*3/UL (ref 1.2–7.7)
NEUTROPHILS NFR BLD AUTO: 60 %
NRBC BLD-RTO: NORMAL /100{WBCS}
PLATELET # BLD AUTO: 234 X10*3/UL (ref 150–450)
RBC # BLD AUTO: 5.11 X10*6/UL (ref 4.5–5.9)
WBC # BLD AUTO: 6 X10*3/UL (ref 4.4–11.3)

## 2024-06-18 PROCEDURE — 83615 LACTATE (LD) (LDH) ENZYME: CPT

## 2024-06-18 PROCEDURE — 36415 COLL VENOUS BLD VENIPUNCTURE: CPT

## 2024-06-18 PROCEDURE — 80053 COMPREHEN METABOLIC PANEL: CPT

## 2024-06-18 PROCEDURE — 85025 COMPLETE CBC W/AUTO DIFF WBC: CPT

## 2024-06-19 LAB
ALBUMIN SERPL BCP-MCNC: 4.4 G/DL (ref 3.4–5)
ALP SERPL-CCNC: 81 U/L (ref 33–136)
ALT SERPL W P-5'-P-CCNC: 30 U/L (ref 10–52)
ANION GAP SERPL CALC-SCNC: 13 MMOL/L (ref 10–20)
AST SERPL W P-5'-P-CCNC: 25 U/L (ref 9–39)
BILIRUB SERPL-MCNC: 0.6 MG/DL (ref 0–1.2)
BUN SERPL-MCNC: 25 MG/DL (ref 6–23)
CALCIUM SERPL-MCNC: 9.2 MG/DL (ref 8.6–10.6)
CHLORIDE SERPL-SCNC: 104 MMOL/L (ref 98–107)
CO2 SERPL-SCNC: 28 MMOL/L (ref 21–32)
CREAT SERPL-MCNC: 1.02 MG/DL (ref 0.5–1.3)
EGFRCR SERPLBLD CKD-EPI 2021: 83 ML/MIN/1.73M*2
GLUCOSE SERPL-MCNC: 133 MG/DL (ref 74–99)
LDH SERPL L TO P-CCNC: 147 U/L (ref 84–246)
POTASSIUM SERPL-SCNC: 4.7 MMOL/L (ref 3.5–5.3)
PROT SERPL-MCNC: 6.7 G/DL (ref 6.4–8.2)
SODIUM SERPL-SCNC: 140 MMOL/L (ref 136–145)

## 2024-06-21 ENCOUNTER — OFFICE VISIT (OUTPATIENT)
Dept: HEMATOLOGY/ONCOLOGY | Facility: CLINIC | Age: 64
End: 2024-06-21
Payer: COMMERCIAL

## 2024-06-21 VITALS
RESPIRATION RATE: 12 BRPM | BODY MASS INDEX: 27.54 KG/M2 | DIASTOLIC BLOOD PRESSURE: 72 MMHG | WEIGHT: 203.04 LBS | TEMPERATURE: 97.5 F | SYSTOLIC BLOOD PRESSURE: 114 MMHG | OXYGEN SATURATION: 96 % | HEART RATE: 71 BPM

## 2024-06-21 DIAGNOSIS — C88.4 MALT (MUCOSA ASSOCIATED LYMPHOID TISSUE) (MULTI): ICD-10-CM

## 2024-06-21 DIAGNOSIS — E89.0 HYPOTHYROIDISM, POSTSURGICAL: ICD-10-CM

## 2024-06-21 PROCEDURE — 99214 OFFICE O/P EST MOD 30 MIN: CPT | Performed by: INTERNAL MEDICINE

## 2024-06-21 PROCEDURE — 3078F DIAST BP <80 MM HG: CPT | Performed by: INTERNAL MEDICINE

## 2024-06-21 PROCEDURE — G2211 COMPLEX E/M VISIT ADD ON: HCPCS | Performed by: INTERNAL MEDICINE

## 2024-06-21 PROCEDURE — 3074F SYST BP LT 130 MM HG: CPT | Performed by: INTERNAL MEDICINE

## 2024-06-21 ASSESSMENT — PAIN SCALES - GENERAL: PAINLEVEL: 0-NO PAIN

## 2024-06-21 NOTE — PROGRESS NOTES
Follow-up in 3 months with labs prior. Call back instructions reviewed.  Patient verbalized understanding.

## 2024-06-21 NOTE — PROGRESS NOTES
"Patient ID: Braxton Coello \"Comfort" is a 64 y.o. male.  Referring Physician: Robbie Murillo MD  [address] [facility], [place] [address]  Primary Care Provider: CARLOS Lo    ORDERS & PATIENT INSTRUCTIONS:      Return for follow-up in 3 months  CBC CMP LDH at that time        ASSESSMENT, PROBLEM LIST, DECISION MAKING, PLAN.    MALT lymphoma of stomach, diagnosed in January 2023, stage I E  EGD by Dr. Lucio Mendez showed 2 cm hiatal hernia moderate erythema of the body and several 2 mm erosion which was biopsied and showed atypical lymphoid infiltrate suggestive of MALT lymphoma,, H. pylori negative.  Lymphocytes were CD20 positive, Bcl-2 positive and coexpression of CD43, repeat H. pylori was negative as well   Additional studies for B-cell gene rearrangement and FISH analysis for t(11:18) translocation may be helpful so specimen has been sent to [facility].    Bone marrow aspirate / biopsy is negative At [facility], PET scan showed only activity in the stomach and additional activity in the right palatine tonsil of unclear etiology.    Patient had right tonsil biopsy showed focal chronic inflammation, no evidence of malignancy, right base of the tongue biopsy showed some chronic inflammation but no malignancy, by Dr. Jose Logan on 8 March 2023.      Patient had radiation to stomach started on 1 March 2023 and ended on 20 March 2023, he had 14 treatment with total dose of 25.2 Gy under care of Dr. Pagan at [facility] [place]    Patient had a follow-up EGD by Dr. Lucio Mendez on 28 June 2023 and had normal stomach at that time.  Net PET was negative in October 2023      PAST MEDICAL HISTORY:     hypertension, hypothyroidism post thyroidectomy 15 years prior      INTERVAL HISTORY :   Patient returns today for follow-up after patient finished radiation therapy for MALT lymphoma of the stomach in March 2023  Patient had a follow-up EGD by Dr. Lucio Mendez on 28 June " 2023 and had normal stomach at that time.  Patient had a negative PET scan in October 2023  Currently he is asymptomatic denies any fever weight loss night sweats or abdominal pain, denies any new lymphadenopathy.      PHYSICAL EXAM:  General: Conscious, alert, oriented x3, not in acute distress.  HEENT:    No icterus.    Chest:Bilateral symmetrical,     CVS:  S1, S2.    Abdomen:  Soft, no palpable mass   Extremities: No clubbing, cyanosis,     Skin: No petechial rash.        Patient had net PET done on 12 October 2023 showed no abnormal activity suggestive of recurrence or persistent disease.      ASSESSMENT & PLAN:    Patient  with H. pylori negative MALT lymphoma involving stomach stage I E.  Staging PET scan was independently reviewed and reviewed with the patient and wife shows heterogeneous activity in the stomach, no other distant lymphadenopathy noted except borderline activity on right palatine tonsil, on physical exam he has mild erythema in his throat but otherwise I do not see any obvious mass.  Could very well be reactive versus second primary and less likely related to his MALT lymphoma.  Bone marrow biopsy is negative for involvement with lymphoma.       There was a second opinion from [facility] pathology department and B-cell gene rearrangement was also positive, consistent with B-cell lymphoma       Patient had right tonsil biopsy showed focal chronic inflammation, no evidence of malignancy, right base of the tongue biopsy showed some chronic inflammation but no malignancy, by Dr. Jose Logan on 8 March 2023.      Patient had radiation to stomach started on 1 March 2023 and ended on 20 March 2023, he had 14 treatment with total dose of 25.2 [place]ras under care of Dr. Pagan at [facility]  Patient had EGD on June 28, 2023 by Dr. Lucio Mendez and endoscopically there was no mass identified.    Patient had negative PET scan in October 2023.  His CBC CMP and LDH is normal, he does not  "have any B symptoms of fever weight loss night sweats, clinical there is no evidence of recurrence, continue watchful waiting and will now return for follow-up in 4 months.        Discussed with patient and wife.      VITALS:   2.16 meters squared /72   Pulse 71   Temp 36.4 °C (97.5 °F)   Resp 12   Wt 92.1 kg (203 lb 0.7 oz)   SpO2 96%   BMI 27.54 kg/m²     LABS:    CBC:  Recent Labs     06/18/24  0801 02/13/24  0807 10/17/23  1140 07/14/23  0813 01/20/23  0952   WBC 6.0 5.1 5.7 6.0 6.8   HGB 15.6 15.4 15.5 15.4 15.6   HCT 46.7 47.3 46.7 48.0 47.7    218 214 221 253   MCV 91 93 93 94 91       CMP:  Recent Labs     06/18/24  0801 02/13/24  0807 10/17/23  1140 07/14/23  0813 01/20/23  0952    145 144 144 142   K 4.7 4.7 4.9 4.5 5.0    109* 106 107 103   CO2 28 26 27 30 32   ANIONGAP 13 15 16 12 12   BUN 25* 22 20 23 23   CREATININE 1.02 0.99 1.00 1.10 1.04   EGFR 83 86 85  --   --      Recent Labs     06/18/24  0801 02/13/24  0807 10/17/23  1140 07/14/23  0813 01/20/23  0952   ALBUMIN 4.4 4.2 4.5 4.2 4.4   ALKPHOS 81 74 73 85 71   ALT 30 31 31 42 23   AST 25 32 28 37 22   BILITOT 0.6 0.7 0.7 0.6 0.7       HEME/ENDO:  Recent Labs     11/01/23  0834 07/14/23  0813 11/17/22  0925 11/18/21  0916 11/13/20  0912   FERRITIN  --  125  --   --   --    IRONSAT  --  19*  --   --   --    TSH 3.36  --  0.67 0.42* 0.78        MICRO: No results for input(s): \"ESR\", \"CRP\", \"PROCAL\" in the last 23777 hours.  No results found for the last 90 days.        TUMOR MARKERS:  PSA (ng/mL)   Date Value   11/18/2021 1.37   11/13/2020 1.33                IMAGING:         CT cardiac scoring wo IV contrast  Narrative: Interpreted By:  Bhupinder Graham,   STUDY:  CT CARDIAC SCORING WO IV CONTRAST;  1/10/2024 8:35 am      INDICATION:  Signs/Symptoms:screening test,  htn.      COMPARISON:  None.      ACCESSION NUMBER(S):  [id number]      ORDERING CLINICIAN:  ROBBY SOL      TECHNIQUE:  Using prospective ECG " "gating, CT scan of the coronary arteries was  performed without intravenous contrast. Coronary calcium scoring  was  performed according to the method of Agatston.      FINDINGS:  The score and distribution of calcium in the coronary arteries is as  follows:      LM 0  LAD 0  LCx 0  RCA 0      Total 0      The visualized mid/lower ascending thoracic aorta measures 3.96 cm in  diameter. The heart is normal in size. No pericardial effusion is  present.      No gross evidence of mediastinal or hilar lymphadenopathy or masses  is identified. The visualized segments of the lungs are normally  expanded.      The visualized subdiaphragmatic structures appear intact.      Impression: 1. Coronary artery calcium score of 0*.      *Coronary artery calcium scoring may be helpful in predicting the  risk for future coronary heart disease events.  According to the  American College of Cardiology Foundation Clinical Expert Consensus  Task Force, such testing provides important prognostic information in  patients with more than one coronary heart disease risk factor. The  coronary artery calcium score correlates with the annual risk of a  non-fatal myocardial infarction or coronary heart disease death.      Coronary artery score            Annual Risk      0-99                             0.4%  100-399                        1.3%  >400                            2.4%      These three \"breakpoints\" correspond to lower, intermediate and high  risk states for future coronary events.  Such information should be  used, along with appropriate clinical judgment, to make decisions  regarding the intensity of risk factor management strategies to treat  blood lipids and to modify other non-lipid coronary risk factors.      Reference: [place] P et al. Circulation.  2007; 115:402-426      MACRO:  None      Signed by: Bhupinder Graham 1/10/2024 12:14 PM  Dictation workstation:   [id number]       Current Outpatient Medications   Medication Sig " Dispense Refill    glucosamine-chondroitin 500-400 mg tablet Take 1 tablet by mouth early in the morning..      levothyroxine (Synthroid, Levoxyl) 137 mcg tablet Take 1 tablet (137 mcg) by mouth once daily. 90 tablet 3    multivitamin tablet Take 1 tablet by mouth once daily.      omega 3-dha-epa-fish oil (Fish OiL) 1,000 mg (120 mg-180 mg) capsule Take by mouth.      valsartan (Diovan) 160 mg tablet Take 1 tablet (160 mg) by mouth once daily. For blood pressure 90 tablet 3     No current facility-administered medications for this visit.            FAMILY HISTORY:   Family History   Problem Relation Name Age of Onset    Goiter Mother          Total Thyroidectomy    Pancreatic cancer Mother      Other (Cigarette use) Father      Heart attack Father      Thyroid cancer Sister      Heart attack Sister          ALLERGY: Grass pollen    SOCIAL HISTORY: He  reports no history of alcohol use. He  reports no history of drug use.            Medication reviewed in e-chart  Patient is monitored for medication toxicity  labs reviewed and interpreted independently, X rays independently reviewed  Notes from other physicians involved in care were reviewed    Charting was completed using voice recognition technology and may include unintended errors.    IVETTE ARREOLA MD, ADENIKE.    [facility] Master Clinician in Hematology and Oncology  Medical Director, [facility] at [facility].  [place] office  Phone [phone]  Fax      [phone]  [facility] /[place].  Phone [phone]  Fax     [phone]

## 2024-10-30 ENCOUNTER — TELEPHONE (OUTPATIENT)
Dept: PRIMARY CARE | Facility: CLINIC | Age: 64
End: 2024-10-30
Payer: COMMERCIAL

## 2024-10-30 DIAGNOSIS — I10 HYPERTENSION, UNSPECIFIED TYPE: ICD-10-CM

## 2024-10-30 RX ORDER — VALSARTAN 160 MG/1
160 TABLET ORAL DAILY
Qty: 90 TABLET | Refills: 0 | Status: SHIPPED | OUTPATIENT
Start: 2024-10-30 | End: 2025-10-30

## 2024-11-04 ENCOUNTER — APPOINTMENT (OUTPATIENT)
Dept: PRIMARY CARE | Facility: CLINIC | Age: 64
End: 2024-11-04
Payer: COMMERCIAL

## 2024-11-05 DIAGNOSIS — E89.0 POSTOPERATIVE HYPOTHYROIDISM: ICD-10-CM

## 2024-11-05 RX ORDER — LEVOTHYROXINE SODIUM 137 UG/1
137 TABLET ORAL DAILY
Qty: 90 TABLET | Refills: 4 | Status: SHIPPED | OUTPATIENT
Start: 2024-11-05 | End: 2025-11-05

## 2024-11-12 ASSESSMENT — ENCOUNTER SYMPTOMS
CONSTIPATION: 0
POLYDIPSIA: 0
ADENOPATHY: 0
DIZZINESS: 0
PALPITATIONS: 0
RHINORRHEA: 0
SHORTNESS OF BREATH: 0
BRUISES/BLEEDS EASILY: 0
POLYPHAGIA: 0
FATIGUE: 0
FREQUENCY: 0
ARTHRALGIAS: 0
MYALGIAS: 0
SORE THROAT: 0
HEMATURIA: 0
CHILLS: 0
HEADACHES: 0
DYSPHORIC MOOD: 0
VOMITING: 0
COUGH: 0
EYE DISCHARGE: 0
WHEEZING: 0
FEVER: 0
NERVOUS/ANXIOUS: 0
DIARRHEA: 0
BLOOD IN STOOL: 0
ABDOMINAL PAIN: 0
NAUSEA: 0
DYSURIA: 0
EYE REDNESS: 0

## 2024-11-13 ENCOUNTER — LAB (OUTPATIENT)
Dept: LAB | Facility: CLINIC | Age: 64
End: 2024-11-13
Payer: COMMERCIAL

## 2024-11-13 ENCOUNTER — APPOINTMENT (OUTPATIENT)
Dept: PRIMARY CARE | Facility: CLINIC | Age: 64
End: 2024-11-13
Payer: COMMERCIAL

## 2024-11-13 VITALS
TEMPERATURE: 98.5 F | HEIGHT: 72 IN | DIASTOLIC BLOOD PRESSURE: 82 MMHG | BODY MASS INDEX: 28.61 KG/M2 | SYSTOLIC BLOOD PRESSURE: 120 MMHG | HEART RATE: 66 BPM | WEIGHT: 211.2 LBS

## 2024-11-13 DIAGNOSIS — I10 PRIMARY HYPERTENSION: ICD-10-CM

## 2024-11-13 DIAGNOSIS — Z00.00 ROUTINE GENERAL MEDICAL EXAMINATION AT A HEALTH CARE FACILITY: Primary | ICD-10-CM

## 2024-11-13 DIAGNOSIS — C88.40 MALT (MUCOSA ASSOCIATED LYMPHOID TISSUE): ICD-10-CM

## 2024-11-13 DIAGNOSIS — E89.0 HYPOTHYROIDISM, POSTSURGICAL: ICD-10-CM

## 2024-11-13 DIAGNOSIS — I10 HYPERTENSION, UNSPECIFIED TYPE: ICD-10-CM

## 2024-11-13 DIAGNOSIS — Z12.5 SCREENING FOR MALIGNANT NEOPLASM OF PROSTATE: ICD-10-CM

## 2024-11-13 PROBLEM — E03.9 HYPOTHYROIDISM: Status: RESOLVED | Noted: 2024-02-14 | Resolved: 2024-11-13

## 2024-11-13 PROBLEM — R10.9 ABDOMINAL PAIN: Status: RESOLVED | Noted: 2022-11-22 | Resolved: 2024-11-13

## 2024-11-13 PROBLEM — Z12.11 SPECIAL SCREENING FOR MALIGNANT NEOPLASM OF COLON: Status: RESOLVED | Noted: 2023-10-12 | Resolved: 2024-11-13

## 2024-11-13 LAB
ALBUMIN SERPL BCP-MCNC: 4.3 G/DL (ref 3.4–5)
ALP SERPL-CCNC: 76 U/L (ref 33–136)
ALT SERPL W P-5'-P-CCNC: 26 U/L (ref 10–52)
ANION GAP SERPL CALC-SCNC: 11 MMOL/L (ref 10–20)
AST SERPL W P-5'-P-CCNC: 23 U/L (ref 9–39)
BASOPHILS # BLD AUTO: 0.05 X10*3/UL (ref 0–0.1)
BASOPHILS NFR BLD AUTO: 1.1 %
BILIRUB SERPL-MCNC: 0.7 MG/DL (ref 0–1.2)
BUN SERPL-MCNC: 23 MG/DL (ref 6–23)
CALCIUM SERPL-MCNC: 9.4 MG/DL (ref 8.6–10.6)
CHLORIDE SERPL-SCNC: 106 MMOL/L (ref 98–107)
CO2 SERPL-SCNC: 31 MMOL/L (ref 21–32)
CREAT SERPL-MCNC: 1.06 MG/DL (ref 0.5–1.3)
EGFRCR SERPLBLD CKD-EPI 2021: 79 ML/MIN/1.73M*2
EOSINOPHIL # BLD AUTO: 0.21 X10*3/UL (ref 0–0.7)
EOSINOPHIL NFR BLD AUTO: 4.4 %
ERYTHROCYTE [DISTWIDTH] IN BLOOD BY AUTOMATED COUNT: 12.7 % (ref 11.5–14.5)
GLUCOSE SERPL-MCNC: 88 MG/DL (ref 74–99)
HCT VFR BLD AUTO: 46.1 % (ref 41–52)
HGB BLD-MCNC: 15.4 G/DL (ref 13.5–17.5)
IMM GRANULOCYTES # BLD AUTO: 0 X10*3/UL (ref 0–0.7)
IMM GRANULOCYTES NFR BLD AUTO: 0 % (ref 0–0.9)
LDH SERPL L TO P-CCNC: 129 U/L (ref 84–246)
LYMPHOCYTES # BLD AUTO: 1.07 X10*3/UL (ref 1.2–4.8)
LYMPHOCYTES NFR BLD AUTO: 22.7 %
MCH RBC QN AUTO: 30.8 PG (ref 26–34)
MCHC RBC AUTO-ENTMCNC: 33.4 G/DL (ref 32–36)
MCV RBC AUTO: 92 FL (ref 80–100)
MONOCYTES # BLD AUTO: 0.48 X10*3/UL (ref 0.1–1)
MONOCYTES NFR BLD AUTO: 10.2 %
NEUTROPHILS # BLD AUTO: 2.91 X10*3/UL (ref 1.2–7.7)
NEUTROPHILS NFR BLD AUTO: 61.6 %
NRBC BLD-RTO: ABNORMAL /100{WBCS}
PLATELET # BLD AUTO: 213 X10*3/UL (ref 150–450)
POTASSIUM SERPL-SCNC: 5 MMOL/L (ref 3.5–5.3)
PROT SERPL-MCNC: 6.6 G/DL (ref 6.4–8.2)
RBC # BLD AUTO: 5 X10*6/UL (ref 4.5–5.9)
SODIUM SERPL-SCNC: 143 MMOL/L (ref 136–145)
WBC # BLD AUTO: 4.7 X10*3/UL (ref 4.4–11.3)

## 2024-11-13 PROCEDURE — 36415 COLL VENOUS BLD VENIPUNCTURE: CPT

## 2024-11-13 PROCEDURE — 3079F DIAST BP 80-89 MM HG: CPT | Performed by: NURSE PRACTITIONER

## 2024-11-13 PROCEDURE — 90715 TDAP VACCINE 7 YRS/> IM: CPT | Performed by: NURSE PRACTITIONER

## 2024-11-13 PROCEDURE — 99396 PREV VISIT EST AGE 40-64: CPT | Performed by: NURSE PRACTITIONER

## 2024-11-13 PROCEDURE — 3008F BODY MASS INDEX DOCD: CPT | Performed by: NURSE PRACTITIONER

## 2024-11-13 PROCEDURE — 80053 COMPREHEN METABOLIC PANEL: CPT

## 2024-11-13 PROCEDURE — 1036F TOBACCO NON-USER: CPT | Performed by: NURSE PRACTITIONER

## 2024-11-13 PROCEDURE — 90471 IMMUNIZATION ADMIN: CPT | Performed by: NURSE PRACTITIONER

## 2024-11-13 PROCEDURE — 83615 LACTATE (LD) (LDH) ENZYME: CPT

## 2024-11-13 PROCEDURE — 85025 COMPLETE CBC W/AUTO DIFF WBC: CPT

## 2024-11-13 PROCEDURE — 3074F SYST BP LT 130 MM HG: CPT | Performed by: NURSE PRACTITIONER

## 2024-11-13 RX ORDER — VALSARTAN 160 MG/1
160 TABLET ORAL DAILY
Qty: 90 TABLET | Refills: 4 | Status: SHIPPED | OUTPATIENT
Start: 2024-11-13 | End: 2025-11-13

## 2024-11-13 ASSESSMENT — PATIENT HEALTH QUESTIONNAIRE - PHQ9
SUM OF ALL RESPONSES TO PHQ9 QUESTIONS 1 AND 2: 0
1. LITTLE INTEREST OR PLEASURE IN DOING THINGS: NOT AT ALL
2. FEELING DOWN, DEPRESSED OR HOPELESS: NOT AT ALL

## 2024-11-13 NOTE — PATIENT INSTRUCTIONS
Meds refilled. The number of refills on the meds match when you need to return to the office for an appt. I recommend making your next appt today so you don't run out of your medication as it may take me up to 3 days to refill it.    Handouts given to pt:  physical handout        Labs- No appt needed:    You can use the lab in our building when fasting. The hrs are: Mon-Fri 7a-330p.  No Saturday hrs. Bring the paper order.   OR   [facility] Mon-Fri 7a-12p. No Saturday hrs. Bring the paper order.  OR   [facility] Mon-Fri 630a-530p or Sat 6:30a-12p. Bring the paper order  OR  [facility] Mon-FrI 7a-5p, Sat 8a-12p  [facility] Mon-Fri 730a-4p, Sat  8a-12p  [facility] [address] Mon-Thurs 630a-6p , Fri 630a-4p, Sat 8a-12p  [facility] [address] Mon-Fri 7a-630p and Sat. 7a-3p    Fasting is no food, drink, gum or mints other than water for 12 hrs.   Results will be back in 2-3 business days for most labs. It is always recommended for any orders (labs, xrays, ultrasounds,MRI, ct scan, procedures etc) to check with your insurance provider for expected costs or expenses to you.       You will get your results via phone from my medical assistant if you do not have MyChart.  OR  You will get your results via ASP[employer]    If a result is urgent, I will call to speak to you.    Vaccines:  ---- flu vaccine-declines    ---- Tdap today    ---- Shingrix-declines    ---- RSV vaccine-declines        General recommendations:  Exercise-cardio 4-5d/wk 30min each day  Diet-Breakfast-toast (my favorite Ninfa Rodriguez Delighful Multigrain or Jose's Killer Bread Good Seed thin-sliced)/bagel/English muffin-whole wheat flour as a 1st ingredient or cereal/oatmeal/granola bar-fiber 4g or more or protein like eggs or peanut butter; optional veggies  Lunch-protein, 1/2c carb or 2 slices bread, veg 1c  Dinner-protein, fist sized carb, veg 1c  Fruit 2 a day  Dairy 2 a day-milk, soy milk, almond  milk, cheese, yogurt, cottage cheese  Snacks-Protein-hard boiled egg, nuts (walnuts/almonds/pecans/pistachios 1/4c), hummus, beef/deer jerky or meat sticks; vegetable, fruit, dairy-milk(1%, skim, almond, soy)/cheese (not a lot of cheddar)/yogurt (Greek is best-my favorite Dannon Fruit on the Bottom Greek)/cottage cheese 2%; triscuits/ popcorn/wheat thins have a lot of fiber; follow serving size on bag/box/container  increase water  Limit alcohol to 1 drink per day for women and 2 drinks per day for men (1 drink=12oz beer or 5oz wine or 1 1/2oz liquor)  Calcium: 500mg 1 twice a day if age 50 and younger and 600mg 1 twice a day if over age 50 (calcium citrate can be taken without food)  Vitamin D: 800-5000 IU/day  Limit salt to <2300mg a day if age 50 and under and <1500mg a day if over age 50/have high bp or diabetes or kidney disease  Recommend folate for childbearing age women 0.4mg per day (can be found in a multivitamin)  Recommend 18mg/dL of iron a day if age 50 and under and 8mg/dL a day if over age 50; take on an empty stomach at bedtime  Use sunscreen   Wear seatbelt  Recommend safe sex practices: using condoms everytime you have sex, discuss with a new partner about their past partners/history of STDs/drug use, avoid drinking alcohol or using drugs as this increases the chance that you will participate in high-risk sex, for oral sex help protect your mouth by having your partner use a condom (male or female), women should not douche after sex, be aware of your partner's body and your body-look for signs of a sore, blister, rash, or discharge, and have regular exams and periodic tests for STDs.  No distracted driving  No driving when under influence of substances  Wear a seatbelt  Eye dr every 1-2yrs  Dentist every 6-12 mon  Tetanus shot every 10yrs  Recommend flu vaccine in the fall  Appt in  1 year for physical      I will communicate with you via [employer] regarding messages and results. If you need help  with this, you can call the support line at [phone].    IT WAS A PLEASURE TO SEE YOU TODAY. THANK YOU FOR CHOOSING US FOR YOUR HEALTHCARE NEEDS.

## 2024-11-13 NOTE — PROGRESS NOTES
"Subjective   Patient ID: Braxton Coello \"Comfort" is a 64 y.o. male who presents for Annual Exam (Had blood work this morning. ).    new pt-honorio-last pcp, gi,  others? Dr. Murillo-onco    Any forms to fill out? No  is pt fasting? No  Does pt want a flu shot? No  Last tdap-unsure of last, will get update today.   Last Shingrix-Never had  Has pt gotten the RSV vaccine-No  Bps at home-has a monitor at home-not checking  last colonoscopy-Never, had cologuard 12/2021  Any other questions or concerns that you want to discuss today?  No    Family history form        No care team member to display    HPI  Last labs-6/2024 cmp nl, cbc nl  11/2023 vit d nl, lipid nl, psa nl, tsh nl  Due for labs- psa, lipid, tsh, fbs    Cholesterol   Date Value Ref Range Status   11/01/2023 161 0 - 199 mg/dL Final     Comment:           Age      Desirable   Borderline High   High     0-19 Y     0 - 169       170 - 199     >/= 200    20-24 Y     0 - 189       190 - 224     >/= 225         >24 Y     0 - 199       200 - 239     >/= 240   **All ranges are based on fasting samples. Specific   therapeutic targets will vary based on patient-specific   cardiac risk.    Pediatric guidelines reference:Pediatrics 2011, 128(S5).Adult guidelines reference: NCEP ATPIII Guidelines,ALINE 2001, 258:2486-97    Venipuncture immediately after or during the administration of Metamizole may lead to falsely low results. Testing should be performed immediately prior to Metamizole dosing.   11/17/2022 135 0 - 199 mg/dL Final     Comment:     .      AGE      DESIRABLE   BORDERLINE HIGH   HIGH     0-19 Y     0 - 169       170 - 199     >/= 200    20-24 Y     0 - 189       190 - 224     >/= 225         >24 Y     0 - 199       200 - 239     >/= 240   **All ranges are based on fasting samples. Specific   therapeutic targets will vary based on patient-specific   cardiac risk.  .   Pediatric guidelines reference:Pediatrics 2011, 128(S5).   Adult guidelines reference: NCEP " ATPIII Guidelines,     ALINE 2001, 258:2486-97  .   Venipuncture immediately after or during the    administration of Metamizole may lead to falsely   low results. Testing should be performed immediately   prior to Metamizole dosing.     11/08/2019 164 0 - 199 mg/dL Final     Comment:     .      AGE      DESIRABLE   BORDERLINE HIGH   HIGH     0-19 Y     0 - 169       170 - 199     >/= 200    20-24 Y     0 - 189       190 - 224     >/= 225         >24 Y     0 - 199       200 - 239     >/= 240   **All ranges are based on fasting samples. Specific   therapeutic targets will vary based on patient-specific   cardiac risk.  .   Pediatric guidelines reference:Pediatrics 2011, 128(S5).   Adult guidelines reference: NCEP ATPIII Guidelines,     ALINE 2001, 258:2486-97  .   Venipuncture immediately after or during the    administration of Metamizole may lead to falsely   low results. Testing should be performed immediately   prior to Metamizole dosing.       Triglycerides   Date Value Ref Range Status   11/01/2023 124 0 - 149 mg/dL Final     Comment:        Age         Desirable   Borderline High   High     Very High   0 D-90 D    19 - 174         ----         ----        ----  91 D- 9 Y     0 -  74        75 -  99     >/= 100      ----    10-19 Y     0 -  89        90 - 129     >/= 130      ----    20-24 Y     0 - 114       115 - 149     >/= 150      ----         >24 Y     0 - 149       150 - 199    200- 499    >/= 500    Venipuncture immediately after or during the administration of Metamizole may lead to falsely low results. Testing should be performed immediately prior to Metamizole dosing.   11/17/2022 95 0 - 149 mg/dL Final     Comment:     .      AGE      DESIRABLE   BORDERLINE HIGH   HIGH     VERY HIGH   0 D-90 D    19 - 174         ----         ----        ----  91 D- 9 Y     0 -  74        75 -  99     >/= 100      ----    10-19 Y     0 -  89        90 - 129     >/= 130      ----    20-24 Y     0 - 114       115 - 149   "   >/= 150      ----         >24 Y     0 - 149       150 - 199    200- 499    >/= 500  .   Venipuncture immediately after or during the    administration of Metamizole may lead to falsely   low results. Testing should be performed immediately   prior to Metamizole dosing.     11/08/2019 92 0 - 149 mg/dL Final     Comment:     .      AGE      DESIRABLE   BORDERLINE HIGH   HIGH     VERY HIGH   0 D-90 D    19 - 174         ----         ----        ----  91 D- 9 Y     0 -  74        75 -  99     >/= 100      ----    10-19 Y     0 -  89        90 - 129     >/= 130      ----    20-24 Y     0 - 114       115 - 149     >/= 150      ----         >24 Y     0 - 149       150 - 199    200- 499    >/= 500  .   Venipuncture immediately after or during the    administration of Metamizole may lead to falsely   low results. Testing should be performed immediately   prior to Metamizole dosing.       HDL-Cholesterol   Date Value Ref Range Status   11/01/2023 47.9 mg/dL Final     Comment:       Age       Very Low   Low     Normal    High    0-19 Y    < 35      < 40     40-45     ----  20-24 Y    ----     < 40      >45      ----        >24 Y      ----     < 40     40-60      >60       HDL   Date Value Ref Range Status   11/17/2022 48.2 mg/dL Final     Comment:     .      AGE      VERY LOW   LOW     NORMAL    HIGH       0-19 Y       < 35   < 40     40-45     ----    20-24 Y       ----   < 40       >45     ----      >24 Y       ----   < 40     40-60      >60  .     11/08/2019 50.2 mg/dL Final     Comment:     .      AGE      VERY LOW   LOW     NORMAL    HIGH       0-19 Y       < 35   < 40     40-45     ----    20-24 Y       ----   < 40       >45     ----      >24 Y       ----   < 40     40-60      >60  .       No results found for: \"LDL\"  Thyroid Stimulating Hormone   Date Value Ref Range Status   11/01/2023 3.36 0.44 - 3.98 mIU/L Final     No results found for: \"A1C\"  No components found for: \"POCA1C\"  No results found for: \"ALBUR\"  No " "components found for: \"POCALBUR\"      Other concerns: none      ER/urgicare visits in the last year- none  Hospitalizations in the last year- none      last colonoscopy/cologuard/FIT (45-75) cologuard 12/3/21  FH colon ca-none  FH prostate ca-none      Exercise- gym 4d a wk  Diet- 2.5 meals  water  Body mass index is 28.64 kg/m².      last dental appt- 2 mon ago    BMs-regular  Sleep-able to fall asleep and stay asleep; no snoring or apnea  no cp, swelling, sob, abd pain, n/v/d/c, blood in stool or black stools  STI testing including hiv (age 15-65) and hep c screening (18-79)-declines  PSA 50-85 with labs      Immunization History   Administered Date(s) Administered    Pfizer Purple Cap SARS-CoV-2 2021, 2021, 10/20/2021       Tdap-today  Shingles vaccine-declines  RSV vaccine-declines  Flu shot-declines    Galleri testing (50yrs+)-discussed    fractures in lifetime-none  Anyone with osteoporosis in the family-none    FH heart attack, heart surgery-dad-mi  age 62, bro-mi/bypass  FH stroke-none    Pt is one of 8kids    The 10-year ASCVD risk score (Bryant HIGGINS, et al., 2019) is: 13.8%    Values used to calculate the score:      Age: 64 years      Sex: Male      Is Non- : No      Diabetic: No      Tobacco smoker: No      Systolic Blood Pressure: 146 mmHg      Is BP treated: Yes      HDL Cholesterol: 47.9 mg/dL      Total Cholesterol: 161 mg/dL  Coronary Artery Calcium score: 1/10/24=0        Review of Systems   Constitutional:  Negative for chills, fatigue and fever.   HENT:  Negative for congestion, ear pain, postnasal drip, rhinorrhea and sore throat.    Eyes:  Negative for discharge, redness and visual disturbance.   Respiratory:  Negative for cough, shortness of breath and wheezing.    Cardiovascular:  Negative for chest pain, palpitations and leg swelling.   Gastrointestinal:  Negative for abdominal pain, blood in stool, constipation, diarrhea, nausea and vomiting. "   Endocrine: Negative for cold intolerance, polydipsia, polyphagia and polyuria.   Genitourinary:  Negative for dysuria, frequency, genital sores, hematuria, penile pain, testicular pain and urgency.   Musculoskeletal:  Negative for arthralgias and myalgias.   Skin:  Negative for rash.   Neurological:  Negative for dizziness, syncope and headaches.   Hematological:  Negative for adenopathy. Does not bruise/bleed easily.   Psychiatric/Behavioral:  Negative for dysphoric mood. The patient is not nervous/anxious.        Objective   Visit Vitals  /89   Pulse 66   Temp 36.9 °C (98.5 °F)      BP Readings from Last 3 Encounters:   11/13/24 146/89   06/21/24 114/72   02/16/24 138/87     Wt Readings from Last 3 Encounters:   11/13/24 95.8 kg (211 lb 3.2 oz)   06/21/24 92.1 kg (203 lb 0.7 oz)   02/16/24 96.5 kg (212 lb 11.9 oz)           Physical Exam  Vitals reviewed.   Constitutional:       General: He is not in acute distress.     Appearance: Normal appearance. He is not ill-appearing.   HENT:      Head: Normocephalic.      Right Ear: Tympanic membrane, ear canal and external ear normal.      Left Ear: Tympanic membrane, ear canal and external ear normal.      Nose: Nose normal.      Mouth/Throat:      Mouth: Mucous membranes are moist.      Pharynx: Oropharynx is clear. No oropharyngeal exudate or posterior oropharyngeal erythema.   Eyes:      Extraocular Movements: Extraocular movements intact.      Conjunctiva/sclera: Conjunctivae normal.      Pupils: Pupils are equal, round, and reactive to light.   Cardiovascular:      Rate and Rhythm: Normal rate and regular rhythm.      Heart sounds: Normal heart sounds. No murmur heard.  Pulmonary:      Effort: Pulmonary effort is normal. No respiratory distress.      Breath sounds: Normal breath sounds. No wheezing, rhonchi or rales.   Abdominal:      General: Bowel sounds are normal. There is no distension.      Palpations: Abdomen is soft. There is no mass.       Tenderness: There is no abdominal tenderness.   Musculoskeletal:         General: No swelling or tenderness. Normal range of motion.      Cervical back: Normal range of motion and neck supple. No tenderness.      Right lower leg: No edema.      Left lower leg: No edema.   Lymphadenopathy:      Cervical: No cervical adenopathy.   Skin:     General: Skin is warm.      Findings: No rash.   Neurological:      General: No focal deficit present.      Mental Status: He is alert and oriented to person, place, and time.      Cranial Nerves: No cranial nerve deficit.   Psychiatric:         Mood and Affect: Mood normal.         Behavior: Behavior normal.       Assessment/Plan   Diagnoses and all orders for this visit:  Routine general medical examination at a health care facility  -     Glucose, fasting; Future  -     TSH with reflex to Free T4 if abnormal; Future  -     Lipid Panel; Future  BMI 28.0-28.9,adult  Screening for malignant neoplasm of prostate  -     Prostate Specific Antigen, Screen; Future  Primary hypertension  Hypothyroidism, postsurgical  Hypertension, unspecified type  -     valsartan (Diovan) 160 mg tablet; Take 1 tablet (160 mg) by mouth once daily. For blood pressure  Other orders  -     Tdap vaccine, age 7 years and older

## 2024-11-15 ENCOUNTER — OFFICE VISIT (OUTPATIENT)
Dept: HEMATOLOGY/ONCOLOGY | Facility: CLINIC | Age: 64
End: 2024-11-15
Payer: COMMERCIAL

## 2024-11-15 VITALS
RESPIRATION RATE: 16 BRPM | HEART RATE: 67 BPM | DIASTOLIC BLOOD PRESSURE: 67 MMHG | TEMPERATURE: 98.1 F | WEIGHT: 206.79 LBS | SYSTOLIC BLOOD PRESSURE: 136 MMHG | BODY MASS INDEX: 28.01 KG/M2 | HEIGHT: 72 IN | OXYGEN SATURATION: 97 %

## 2024-11-15 DIAGNOSIS — C88.40 MALT (MUCOSA ASSOCIATED LYMPHOID TISSUE): ICD-10-CM

## 2024-11-15 DIAGNOSIS — E89.0 HYPOTHYROIDISM, POSTSURGICAL: ICD-10-CM

## 2024-11-15 PROCEDURE — 3075F SYST BP GE 130 - 139MM HG: CPT | Performed by: INTERNAL MEDICINE

## 2024-11-15 PROCEDURE — 3008F BODY MASS INDEX DOCD: CPT | Performed by: INTERNAL MEDICINE

## 2024-11-15 PROCEDURE — 3078F DIAST BP <80 MM HG: CPT | Performed by: INTERNAL MEDICINE

## 2024-11-15 PROCEDURE — 99417 PROLNG OP E/M EACH 15 MIN: CPT | Performed by: INTERNAL MEDICINE

## 2024-11-15 PROCEDURE — 99214 OFFICE O/P EST MOD 30 MIN: CPT | Performed by: INTERNAL MEDICINE

## 2024-11-15 ASSESSMENT — PAIN SCALES - GENERAL: PAINLEVEL_OUTOF10: 0-NO PAIN

## 2024-11-15 NOTE — PROGRESS NOTES
"Patient ID: Braxton Coello \"Comfort" is a 64 y.o. male.  Referring Physician: Robbie Murillo MD  [address], [place] [address]  Primary Care Provider: LUCINDA Correa-CNP    ORDERS & PATIENT INSTRUCTIONS:      Return for follow-up in 6 months  CBC CMP LDH at that time        ASSESSMENT, PROBLEM LIST, DECISION MAKING, PLAN.    MALT lymphoma of stomach, diagnosed in January 2023, stage I E  EGD by Dr. Lucio Mendez showed 2 cm hiatal hernia moderate erythema of the body and several 2 mm erosion which was biopsied and showed atypical lymphoid infiltrate suggestive of MALT lymphoma,, H. pylori negative.  Lymphocytes were CD20 positive, Bcl-2 positive and coexpression of CD43, repeat H. pylori was negative as well   Additional studies for B-cell gene rearrangement and FISH analysis for t(11:18) translocation may be helpful so specimen has been sent to [facility].    Bone marrow aspirate / biopsy is negative At [facility], PET scan showed only activity in the stomach and additional activity in the right palatine tonsil of unclear etiology.    Patient had right tonsil biopsy showed focal chronic inflammation, no evidence of malignancy, right base of the tongue biopsy showed some chronic inflammation but no malignancy, by Dr. Jose Logan on 8 March 2023.      Patient had radiation to stomach started on 1 March 2023 and ended on 20 March 2023, he had 14 treatment with total dose of 25.2 Gy under care of Dr. Pagan at [facility]    Patient had a follow-up EGD by Dr. Lucio Mendez on 28 June 2023 and had normal stomach at that time.  Net PET was negative in October 2023      PAST MEDICAL HISTORY:     hypertension, hypothyroidism post thyroidectomy 15 years prior      INTERVAL HISTORY :   Patient returns today for follow-up after patient finished radiation therapy for MALT lymphoma of the stomach in March 2023  Patient had a follow-up EGD by Dr. Lucio Mendez on 28 " June 2023 and had normal stomach at that time.  Patient had a negative PET scan in October 2023  Currently he is asymptomatic denies any fever weight loss night sweats or abdominal pain, denies any new lymphadenopathy.      PHYSICAL EXAM:  General: Conscious, alert, oriented x3, not in acute distress.  HEENT:    No icterus.    Chest:Bilateral symmetrical,     CVS:  S1, S2.    Abdomen:  Soft, no palpable mass   Extremities: No clubbing, cyanosis,     Skin: No petechial rash.        ASSESSMENT & PLAN:    Patient  with H. pylori negative MALT lymphoma involving stomach stage I E.  Staging PET scan showed heterogeneous activity in the stomach, no other distant lymphadenopathy noted except borderline activity on right palatine tonsil,   Bone marrow biopsy is negative for involvement with lymphoma.     There was a second opinion from [facility] pathology department and B-cell gene rearrangement was also positive, consistent with B-cell lymphoma     Patient had right tonsil biopsy showed focal chronic inflammation, no evidence of malignancy, right base of the tongue biopsy showed some chronic inflammation but no malignancy, by Dr. Jose Logan on 8 March 2023.      Patient had radiation to stomach started on 1 March 2023 and ended on 20 March 2023, he had 14 treatment with total dose of 25.2 Contreras under care of Dr. Pagan at [facility]  Patient had EGD on June 28, 2023 by Dr. Lucio Mendez and endoscopically there was no mass identified.  Patient was advised to follow-up with Dr. Mendez to see the schedule of follow-up EGD and I would prefer him to have follow-up EGD sometime in near future/in few months    Patient had negative PET scan in October 2023.      His CBC CMP and LDH is normal, he does not have any B symptoms of fever weight loss night sweats, clinical there is no evidence of recurrence, continue watchful waiting and will now return for follow-up in 6 months.        VITALS:   2.18 meters squared  "/67   Pulse 67   Temp 36.7 °C (98.1 °F) (Temporal)   Resp 16   Ht 1.825 m (5' 11.85\")   Wt 93.8 kg (206 lb 12.7 oz)   SpO2 97%   BMI 28.16 kg/m²     LABS:    CBC:  Recent Labs     11/13/24  0853 06/18/24  0801 02/13/24  0807 10/17/23  1140 07/14/23  0813   WBC 4.7 6.0 5.1 5.7 6.0   HGB 15.4 15.6 15.4 15.5 15.4   HCT 46.1 46.7 47.3 46.7 48.0    234 218 214 221   MCV 92 91 93 93 94       CMP:  Recent Labs     11/13/24  0853 06/18/24  0801 02/13/24  0807 10/17/23  1140 07/14/23  0813    140 145 144 144   K 5.0 4.7 4.7 4.9 4.5    104 109* 106 107   CO2 31 28 26 27 30   ANIONGAP 11 13 15 16 12   BUN 23 25* 22 20 23   CREATININE 1.06 1.02 0.99 1.00 1.10   EGFR 79 83 86 85  --      Recent Labs     11/13/24  0853 06/18/24  0801 02/13/24  0807 10/17/23  1140 07/14/23  0813   ALBUMIN 4.3 4.4 4.2 4.5 4.2   ALKPHOS 76 81 74 73 85   ALT 26 30 31 31 42   AST 23 25 32 28 37   BILITOT 0.7 0.6 0.7 0.7 0.6       HEME/ENDO:  Recent Labs     11/01/23  0834 07/14/23  0813 11/17/22  0925 11/18/21  0916 11/13/20  0912   FERRITIN  --  125  --   --   --    IRONSAT  --  19*  --   --   --    TSH 3.36  --  0.67 0.42* 0.78        MICRO: No results for input(s): \"ESR\", \"CRP\", \"PROCAL\" in the last 57303 hours.  No results found for the last 90 days.        TUMOR MARKERS:  PSA (ng/mL)   Date Value   11/18/2021 1.37   11/13/2020 1.33                IMAGING:         CT cardiac scoring wo IV contrast  Narrative: Interpreted By:  Bhupinder Graham,   STUDY:  CT CARDIAC SCORING WO IV CONTRAST;  1/10/2024 8:35 am      INDICATION:  Signs/Symptoms:screening test,  htn.      COMPARISON:  None.      ACCESSION NUMBER(S):  [id number]      ORDERING CLINICIAN:  ROBBY SOL      TECHNIQUE:  Using prospective ECG gating, CT scan of the coronary arteries was  performed without intravenous contrast. Coronary calcium scoring  was  performed according to the method of Agatston.      FINDINGS:  The score and distribution of calcium " "in the coronary arteries is as  follows:      LM 0  LAD 0  LCx 0  RCA 0      Total 0      The visualized mid/lower ascending thoracic aorta measures 3.96 cm in  diameter. The heart is normal in size. No pericardial effusion is  present.      No gross evidence of mediastinal or hilar lymphadenopathy or masses  is identified. The visualized segments of the lungs are normally  expanded.      The visualized subdiaphragmatic structures appear intact.      Impression: 1. Coronary artery calcium score of 0*.      *Coronary artery calcium scoring may be helpful in predicting the  risk for future coronary heart disease events.  According to the  American College of Cardiology Foundation Clinical Expert Consensus  Task Force, such testing provides important prognostic information in  patients with more than one coronary heart disease risk factor. The  coronary artery calcium score correlates with the annual risk of a  non-fatal myocardial infarction or coronary heart disease death.      Coronary artery score            Annual Risk      0-99                             0.4%  100-399                        1.3%  >400                            2.4%      These three \"breakpoints\" correspond to lower, intermediate and high  risk states for future coronary events.  Such information should be  used, along with appropriate clinical judgment, to make decisions  regarding the intensity of risk factor management strategies to treat  blood lipids and to modify other non-lipid coronary risk factors.      Reference: Madison P et al. Circulation.  2007; 115:402-426      MACRO:  None      Signed by: Bhupinder Graham 1/10/2024 12:14 PM  Dictation workstation:   [id number]       Current Outpatient Medications   Medication Sig Dispense Refill    glucosamine-chondroitin 500-400 mg tablet Take 1 tablet by mouth early in the morning..      levothyroxine (Synthroid, Levoxyl) 137 mcg tablet Take 1 tablet (137 mcg) by mouth once daily. 90 tablet " 4    multivitamin tablet Take 1 tablet by mouth once daily.      omega 3-dha-epa-fish oil (Fish OiL) 1,000 mg (120 mg-180 mg) capsule Take by mouth.      valsartan (Diovan) 160 mg tablet Take 1 tablet (160 mg) by mouth once daily. For blood pressure 90 tablet 4     No current facility-administered medications for this visit.            FAMILY HISTORY:   Family History   Problem Relation Name Age of Onset    Goiter Mother          Total Thyroidectomy    Pancreatic cancer Mother      Other (Cigarette use) Father      Heart attack Father      Thyroid cancer Sister      Heart attack Brother      Other (cabg) Brother          ALLERGY: Grass pollen    SOCIAL HISTORY: He  reports current alcohol use of about 1.0 standard drink of alcohol per week. He  reports no history of drug use.            Medication reviewed in e-chart  Patient is monitored for medication toxicity  labs reviewed and interpreted independently, X rays independently reviewed  Notes from other physicians involved in care were reviewed    Charting was completed using voice recognition technology and may include unintended errors.    IVETTE ARREOLA MD, ADENIKE.    [facility] Master Clinician in Hematology and Oncology  Medical Director, [facility] [place] [facility].  [place] office  Phone [phone]  Fax      [phone]  [facility].  Phone [phone]  Fax     [phone]

## 2024-11-25 LAB
NON-UH HIE CALCULATED LDL CHOLESTEROL: 98 MG/DL (ref 60–130)
NON-UH HIE CHOLESTEROL: 180 MG/DL (ref 100–200)
NON-UH HIE GLUCOSE: 101 MG/DL (ref 74–106)
NON-UH HIE HDL CHOLESTEROL: 50 MG/DL (ref 40–60)
NON-UH HIE PROSTATIC SPECIFIC AG SCREEN: 2.1 NG/ML (ref 0–4)
NON-UH HIE TOTAL CHOL/HDL CHOL RATIO: 3.6
NON-UH HIE TRIGLYCERIDES: 158 MG/DL (ref 30–150)
NON-UH HIE TSH: 2.32 UIU/ML (ref 0.55–4.78)

## 2024-12-04 DIAGNOSIS — Z12.11 SCREEN FOR COLON CANCER: Primary | ICD-10-CM

## 2024-12-16 ENCOUNTER — TELEPHONE (OUTPATIENT)
Dept: SCHEDULING | Age: 64
End: 2024-12-16
Payer: COMMERCIAL

## 2024-12-16 NOTE — TELEPHONE ENCOUNTER
Nadine from [facility] called (Dr. Gipson's office) [phone] ext [phone]. Phones weren't transferred over and she told me she wanted to discuss mutual [facility] patient.

## 2024-12-18 LAB — NONINV COLON CA DNA+OCC BLD SCRN STL QL: NORMAL

## 2024-12-19 DIAGNOSIS — Z12.11 SCREEN FOR COLON CANCER: Primary | ICD-10-CM

## 2025-01-07 LAB — NONINV COLON CA DNA+OCC BLD SCRN STL QL: NEGATIVE

## 2025-01-29 VITALS
HEART RATE: 67 BPM | HEART RATE: 78 BPM | SYSTOLIC BLOOD PRESSURE: 95 MMHG | SYSTOLIC BLOOD PRESSURE: 101 MMHG | SYSTOLIC BLOOD PRESSURE: 96 MMHG | OXYGEN SATURATION: 95 % | SYSTOLIC BLOOD PRESSURE: 94 MMHG | RESPIRATION RATE: 12 BRPM | HEART RATE: 75 BPM | SYSTOLIC BLOOD PRESSURE: 97 MMHG | SYSTOLIC BLOOD PRESSURE: 110 MMHG | OXYGEN SATURATION: 93 % | DIASTOLIC BLOOD PRESSURE: 64 MMHG | SYSTOLIC BLOOD PRESSURE: 129 MMHG | OXYGEN SATURATION: 96 % | HEART RATE: 59 BPM | HEART RATE: 76 BPM | DIASTOLIC BLOOD PRESSURE: 59 MMHG | HEART RATE: 54 BPM | HEART RATE: 63 BPM | DIASTOLIC BLOOD PRESSURE: 61 MMHG | OXYGEN SATURATION: 93 % | TEMPERATURE: 98.4 F | DIASTOLIC BLOOD PRESSURE: 81 MMHG | HEART RATE: 78 BPM | SYSTOLIC BLOOD PRESSURE: 97 MMHG | WEIGHT: 205 LBS | SYSTOLIC BLOOD PRESSURE: 129 MMHG | HEART RATE: 67 BPM | RESPIRATION RATE: 8 BRPM | RESPIRATION RATE: 20 BRPM | HEART RATE: 59 BPM | DIASTOLIC BLOOD PRESSURE: 57 MMHG | SYSTOLIC BLOOD PRESSURE: 91 MMHG | HEART RATE: 76 BPM | DIASTOLIC BLOOD PRESSURE: 64 MMHG | RESPIRATION RATE: 19 BRPM | TEMPERATURE: 98.4 F | RESPIRATION RATE: 12 BRPM | HEART RATE: 69 BPM | OXYGEN SATURATION: 98 % | RESPIRATION RATE: 18 BRPM | HEIGHT: 72 IN | OXYGEN SATURATION: 96 % | DIASTOLIC BLOOD PRESSURE: 59 MMHG | OXYGEN SATURATION: 98 % | SYSTOLIC BLOOD PRESSURE: 110 MMHG | RESPIRATION RATE: 17 BRPM | HEART RATE: 81 BPM | OXYGEN SATURATION: 95 % | WEIGHT: 205 LBS | HEART RATE: 63 BPM | HEART RATE: 66 BPM | HEART RATE: 90 BPM | SYSTOLIC BLOOD PRESSURE: 91 MMHG | DIASTOLIC BLOOD PRESSURE: 81 MMHG | DIASTOLIC BLOOD PRESSURE: 62 MMHG | RESPIRATION RATE: 12 BRPM | HEART RATE: 54 BPM | SYSTOLIC BLOOD PRESSURE: 95 MMHG | SYSTOLIC BLOOD PRESSURE: 101 MMHG | SYSTOLIC BLOOD PRESSURE: 94 MMHG | SYSTOLIC BLOOD PRESSURE: 110 MMHG | RESPIRATION RATE: 17 BRPM | OXYGEN SATURATION: 94 % | RESPIRATION RATE: 13 BRPM | HEART RATE: 78 BPM | SYSTOLIC BLOOD PRESSURE: 101 MMHG | HEIGHT: 72 IN | HEART RATE: 76 BPM | SYSTOLIC BLOOD PRESSURE: 91 MMHG | RESPIRATION RATE: 20 BRPM | RESPIRATION RATE: 18 BRPM | OXYGEN SATURATION: 93 % | SYSTOLIC BLOOD PRESSURE: 129 MMHG | HEART RATE: 63 BPM | HEART RATE: 81 BPM | RESPIRATION RATE: 8 BRPM | HEART RATE: 90 BPM | DIASTOLIC BLOOD PRESSURE: 61 MMHG | SYSTOLIC BLOOD PRESSURE: 94 MMHG | OXYGEN SATURATION: 96 % | HEART RATE: 90 BPM | SYSTOLIC BLOOD PRESSURE: 97 MMHG | RESPIRATION RATE: 18 BRPM | RESPIRATION RATE: 19 BRPM | DIASTOLIC BLOOD PRESSURE: 62 MMHG | RESPIRATION RATE: 19 BRPM | OXYGEN SATURATION: 94 % | HEART RATE: 69 BPM | RESPIRATION RATE: 8 BRPM | HEART RATE: 75 BPM | DIASTOLIC BLOOD PRESSURE: 62 MMHG | TEMPERATURE: 98.4 F | DIASTOLIC BLOOD PRESSURE: 64 MMHG | OXYGEN SATURATION: 95 % | HEIGHT: 72 IN | RESPIRATION RATE: 13 BRPM | HEART RATE: 54 BPM | SYSTOLIC BLOOD PRESSURE: 96 MMHG | RESPIRATION RATE: 17 BRPM | SYSTOLIC BLOOD PRESSURE: 95 MMHG | HEART RATE: 75 BPM | DIASTOLIC BLOOD PRESSURE: 81 MMHG | DIASTOLIC BLOOD PRESSURE: 61 MMHG | DIASTOLIC BLOOD PRESSURE: 59 MMHG | DIASTOLIC BLOOD PRESSURE: 57 MMHG | HEART RATE: 66 BPM | OXYGEN SATURATION: 94 % | HEART RATE: 67 BPM | RESPIRATION RATE: 20 BRPM | HEART RATE: 69 BPM | HEART RATE: 59 BPM | DIASTOLIC BLOOD PRESSURE: 57 MMHG | WEIGHT: 205 LBS | OXYGEN SATURATION: 98 % | HEART RATE: 81 BPM | HEART RATE: 66 BPM | RESPIRATION RATE: 13 BRPM | SYSTOLIC BLOOD PRESSURE: 96 MMHG

## 2025-02-05 ENCOUNTER — OFFICE (OUTPATIENT)
Dept: URBAN - METROPOLITAN AREA PATHOLOGY 2 | Facility: PATHOLOGY | Age: 65
End: 2025-02-05

## 2025-02-05 ENCOUNTER — AMBULATORY SURGICAL CENTER (OUTPATIENT)
Dept: URBAN - METROPOLITAN AREA SURGERY 12 | Facility: SURGERY | Age: 65
End: 2025-02-05

## 2025-02-05 ENCOUNTER — AMBULATORY SURGICAL CENTER (OUTPATIENT)
Dept: URBAN - METROPOLITAN AREA SURGERY 12 | Facility: SURGERY | Age: 65
End: 2025-02-05
Payer: COMMERCIAL

## 2025-02-05 DIAGNOSIS — Z85.72 PERSONAL HISTORY OF NON-HODGKIN LYMPHOMAS: ICD-10-CM

## 2025-02-05 DIAGNOSIS — K21.00 GASTRO-ESOPHAGEAL REFLUX DISEASE WITH ESOPHAGITIS, WITHOUT B: ICD-10-CM

## 2025-02-05 DIAGNOSIS — K22.89 OTHER SPECIFIED DISEASE OF ESOPHAGUS: ICD-10-CM

## 2025-02-05 DIAGNOSIS — K44.9 DIAPHRAGMATIC HERNIA WITHOUT OBSTRUCTION OR GANGRENE: ICD-10-CM

## 2025-02-05 PROCEDURE — 43239 EGD BIOPSY SINGLE/MULTIPLE: CPT | Performed by: INTERNAL MEDICINE

## 2025-02-05 PROCEDURE — 88305 TISSUE EXAM BY PATHOLOGIST: CPT | Performed by: PATHOLOGY

## 2025-02-05 PROCEDURE — 88313 SPECIAL STAINS GROUP 2: CPT | Performed by: PATHOLOGY

## 2025-02-05 PROCEDURE — 88342 IMHCHEM/IMCYTCHM 1ST ANTB: CPT | Performed by: PATHOLOGY

## 2025-02-06 ENCOUNTER — TELEPHONE (OUTPATIENT)
Dept: PRIMARY CARE | Facility: CLINIC | Age: 65
End: 2025-02-06
Payer: COMMERCIAL

## 2025-02-06 NOTE — TELEPHONE ENCOUNTER
Encounter summary from [facility] faxed to [facility].  Phoned [facility] office and told them Dr. Markel Liang retired and the [facility]; [place] office was permanently closed.  Also indicated Lurdes Lawson CNP is new PCP and gave phone number of office.  Encounter summary dated 02/05/2025 scanned into Media.

## 2025-05-23 ENCOUNTER — APPOINTMENT (OUTPATIENT)
Dept: HEMATOLOGY/ONCOLOGY | Facility: CLINIC | Age: 65
End: 2025-05-23
Payer: COMMERCIAL

## 2025-05-28 ENCOUNTER — LAB (OUTPATIENT)
Dept: LAB | Facility: CLINIC | Age: 65
End: 2025-05-28
Payer: COMMERCIAL

## 2025-05-28 DIAGNOSIS — C85.99 GASTRIC LYMPHOMA (MULTI): ICD-10-CM

## 2025-05-28 DIAGNOSIS — C88.40 MALT (MUCOSA ASSOCIATED LYMPHOID TISSUE): ICD-10-CM

## 2025-05-28 LAB
ALBUMIN SERPL BCP-MCNC: 4.1 G/DL (ref 3.4–5)
ALP SERPL-CCNC: 78 U/L (ref 33–136)
ALT SERPL W P-5'-P-CCNC: 20 U/L (ref 10–52)
ANION GAP SERPL CALC-SCNC: 11 MMOL/L (ref 10–20)
AST SERPL W P-5'-P-CCNC: 19 U/L (ref 9–39)
BASOPHILS # BLD AUTO: 0.04 X10*3/UL (ref 0–0.1)
BASOPHILS NFR BLD AUTO: 0.7 %
BILIRUB SERPL-MCNC: 0.6 MG/DL (ref 0–1.2)
BUN SERPL-MCNC: 20 MG/DL (ref 6–23)
CALCIUM SERPL-MCNC: 8.9 MG/DL (ref 8.6–10.6)
CHLORIDE SERPL-SCNC: 107 MMOL/L (ref 98–107)
CO2 SERPL-SCNC: 28 MMOL/L (ref 21–32)
CREAT SERPL-MCNC: 1.03 MG/DL (ref 0.5–1.3)
EGFRCR SERPLBLD CKD-EPI 2021: 81 ML/MIN/1.73M*2
EOSINOPHIL # BLD AUTO: 0.26 X10*3/UL (ref 0–0.7)
EOSINOPHIL NFR BLD AUTO: 4.7 %
ERYTHROCYTE [DISTWIDTH] IN BLOOD BY AUTOMATED COUNT: 13.6 % (ref 11.5–14.5)
GLUCOSE SERPL-MCNC: 106 MG/DL (ref 74–99)
HCT VFR BLD AUTO: 43.4 % (ref 41–52)
HGB BLD-MCNC: 14.3 G/DL (ref 13.5–17.5)
IMM GRANULOCYTES # BLD AUTO: 0 X10*3/UL (ref 0–0.7)
IMM GRANULOCYTES NFR BLD AUTO: 0 % (ref 0–0.9)
LDH SERPL L TO P-CCNC: 140 U/L (ref 84–246)
LYMPHOCYTES # BLD AUTO: 1.03 X10*3/UL (ref 1.2–4.8)
LYMPHOCYTES NFR BLD AUTO: 18.6 %
MCH RBC QN AUTO: 29.7 PG (ref 26–34)
MCHC RBC AUTO-ENTMCNC: 32.9 G/DL (ref 32–36)
MCV RBC AUTO: 90 FL (ref 80–100)
MONOCYTES # BLD AUTO: 0.49 X10*3/UL (ref 0.1–1)
MONOCYTES NFR BLD AUTO: 8.8 %
NEUTROPHILS # BLD AUTO: 3.72 X10*3/UL (ref 1.2–7.7)
NEUTROPHILS NFR BLD AUTO: 67.2 %
NRBC BLD-RTO: ABNORMAL /100{WBCS}
PLATELET # BLD AUTO: 223 X10*3/UL (ref 150–450)
POTASSIUM SERPL-SCNC: 4.3 MMOL/L (ref 3.5–5.3)
PROT SERPL-MCNC: 6.2 G/DL (ref 6.4–8.2)
RBC # BLD AUTO: 4.82 X10*6/UL (ref 4.5–5.9)
SODIUM SERPL-SCNC: 142 MMOL/L (ref 136–145)
WBC # BLD AUTO: 5.5 X10*3/UL (ref 4.4–11.3)

## 2025-05-28 PROCEDURE — 85025 COMPLETE CBC W/AUTO DIFF WBC: CPT

## 2025-05-28 PROCEDURE — 80053 COMPREHEN METABOLIC PANEL: CPT

## 2025-05-28 PROCEDURE — 36415 COLL VENOUS BLD VENIPUNCTURE: CPT

## 2025-05-28 PROCEDURE — 83615 LACTATE (LD) (LDH) ENZYME: CPT

## 2025-05-30 ENCOUNTER — OFFICE VISIT (OUTPATIENT)
Dept: HEMATOLOGY/ONCOLOGY | Facility: CLINIC | Age: 65
End: 2025-05-30
Payer: COMMERCIAL

## 2025-05-30 VITALS
RESPIRATION RATE: 16 BRPM | BODY MASS INDEX: 28.57 KG/M2 | WEIGHT: 209.77 LBS | SYSTOLIC BLOOD PRESSURE: 128 MMHG | HEART RATE: 79 BPM | DIASTOLIC BLOOD PRESSURE: 81 MMHG | OXYGEN SATURATION: 97 % | TEMPERATURE: 97.2 F

## 2025-05-30 DIAGNOSIS — C88.40 MALT (MUCOSA ASSOCIATED LYMPHOID TISSUE): ICD-10-CM

## 2025-05-30 DIAGNOSIS — E89.0 HYPOTHYROIDISM, POSTSURGICAL: ICD-10-CM

## 2025-05-30 PROCEDURE — 3079F DIAST BP 80-89 MM HG: CPT | Performed by: INTERNAL MEDICINE

## 2025-05-30 PROCEDURE — 99214 OFFICE O/P EST MOD 30 MIN: CPT | Performed by: INTERNAL MEDICINE

## 2025-05-30 PROCEDURE — 3074F SYST BP LT 130 MM HG: CPT | Performed by: INTERNAL MEDICINE

## 2025-05-30 ASSESSMENT — PAIN SCALES - GENERAL: PAINLEVEL_OUTOF10: 0-NO PAIN

## 2025-05-30 NOTE — PATIENT INSTRUCTIONS
ORDERS & PATIENT INSTRUCTIONS:        Return for follow-up in 6 months  CBC CMP LDH at that time

## 2025-05-30 NOTE — PROGRESS NOTES
Follow-up in 6 months with labs prior at [facility]. Call back instructions reviewed.  Patient verbalized understanding.

## 2025-05-30 NOTE — PROGRESS NOTES
"Patient ID: Braxton Coello \"Comfort" is a 65 y.o. male.  Referring Physician: Robbie Murillo MD  [address] [facility], [place] [address]  Primary Care Provider: LUCINDA Correa-CNP    ORDERS & PATIENT INSTRUCTIONS:      Return for follow-up in 6 months  CBC CMP LDH at that time        ASSESSMENT, PROBLEM LIST, DECISION MAKING, PLAN.    MALT lymphoma of stomach, diagnosed in January 2023, stage I E  EGD by Dr. Lucio Mendez showed 2 cm hiatal hernia moderate erythema of the body and several 2 mm erosion which was biopsied and showed atypical lymphoid infiltrate suggestive of MALT lymphoma,, H. pylori negative.  Lymphocytes were CD20 positive, Bcl-2 positive and coexpression of CD43, repeat H. pylori was negative as well   Additional studies for B-cell gene rearrangement and FISH analysis for t(11:18) translocation may be helpful so specimen has been sent to [facility].    Bone marrow aspirate / biopsy is negative At [facility], PET scan showed only activity in the stomach and additional activity in the right palatine tonsil of unclear etiology.    Patient had right tonsil biopsy showed focal chronic inflammation, no evidence of malignancy, right base of the tongue biopsy showed some chronic inflammation but no malignancy, by Dr. Jose Logan on 8 March 2023.      Patient had radiation to stomach started on 1 March 2023 and ended on 20 March 2023, he had 14 treatment with total dose of 25.2 Gy under care of Dr. Pagan at [facility] [place]    Patient had a follow-up EGD by Dr. Lucio Mendez on 28 June 2023 and had normal stomach at that time.  Net PET was negative in October 2023      PAST MEDICAL HISTORY:     hypertension, hypothyroidism post thyroidectomy 15 years prior      INTERVAL HISTORY :   Patient returns today for follow-up after patient finished radiation therapy for MALT lymphoma of the stomach in March 2023  Patient had a follow-up EGD by Dr. Lucio Mendez on 28 " June 2023 and had normal stomach at that time.  Patient had a negative PET scan in October 2023  Currently he is asymptomatic denies any fever weight loss night sweats or abdominal pain, denies any new lymphadenopathy.      PHYSICAL EXAM:  General: Conscious, alert, oriented x3, not in acute distress.  HEENT:    No icterus.    Chest:Bilateral symmetrical,     CVS:  S1, S2.    Abdomen:  Soft, no palpable mass   Extremities: No clubbing, cyanosis,     Skin: No petechial rash.        ASSESSMENT & PLAN:    Patient  with H. pylori negative MALT lymphoma involving stomach stage I E.  Staging PET scan showed heterogeneous activity in the stomach, no other distant lymphadenopathy noted except borderline activity on right palatine tonsil,   Bone marrow biopsy is negative for involvement with lymphoma.     There was a second opinion from [facility] pathology department and B-cell gene rearrangement was also positive, consistent with B-cell lymphoma     Patient had right tonsil biopsy showed focal chronic inflammation, no evidence of malignancy, right base of the tongue biopsy showed some chronic inflammation but no malignancy, by Dr. Jose Logan on 8 March 2023.      Patient had radiation to stomach started on 1 March 2023 and ended on 20 March 2023, he had 14 treatment with total dose of 25.2 [place] under care of Dr. Pagan at [facility]    Patient had a follow-up EGD by Dr. Lucio Mendez on 5 February 2025 showed some hiatal hernia but otherwise was negative, there is no esophageal or gastric mass identified, pathology was negative      Patient had negative PET scan in October 2023.    CBC, CMP, is within normal limits LDH is 140  he does not have any B symptoms of fever weight loss night sweats, clinical there is no evidence of recurrence, continue watchful waiting and will now return for follow-up in 6 months.        VITALS:   2.2 meters squared /81   Pulse 79   Temp 36.2 °C (97.2 °F)   Resp 16   " Wt 95.1 kg (209 lb 12.3 oz)   SpO2 97%   BMI 28.57 kg/m²     LABS:    CBC:  Recent Labs     05/28/25  0823 11/13/24  0853 06/18/24  0801 02/13/24  0807 10/17/23  1140   WBC 5.5 4.7 6.0 5.1 5.7   HGB 14.3 15.4 15.6 15.4 15.5   HCT 43.4 46.1 46.7 47.3 46.7    213 234 218 214   MCV 90 92 91 93 93       CMP:  Recent Labs     05/28/25  0823 11/13/24  0853 06/18/24  0801 02/13/24  0807 10/17/23  1140    143 140 145 144   K 4.3 5.0 4.7 4.7 4.9    106 104 109* 106   CO2 28 31 28 26 27   ANIONGAP 11 11 13 15 16   BUN 20 23 25* 22 20   CREATININE 1.03 1.06 1.02 0.99 1.00   EGFR 81 79 83 86 85     Recent Labs     05/28/25  0823 11/13/24  0853 06/18/24  0801 02/13/24  0807 10/17/23  1140   ALBUMIN 4.1 4.3 4.4 4.2 4.5   ALKPHOS 78 76 81 74 73   ALT 20 26 30 31 31   AST 19 23 25 32 28   BILITOT 0.6 0.7 0.6 0.7 0.7       HEME/ENDO:  Recent Labs     11/01/23  0834 07/14/23  0813 11/17/22  0925 11/18/21  0916 11/13/20  0912   FERRITIN  --  125  --   --   --    IRONSAT  --  19*  --   --   --    TSH 3.36  --  0.67 0.42* 0.78        MICRO: No results for input(s): \"ESR\", \"CRP\", \"PROCAL\" in the last 66798 hours.  No results found for the last 90 days.        TUMOR MARKERS:  PSA (ng/mL)   Date Value   11/18/2021 1.37   11/13/2020 1.33                IMAGING:         CT cardiac scoring wo IV contrast  Narrative: Interpreted By:  Bhupinder Graham,   STUDY:  CT CARDIAC SCORING WO IV CONTRAST;  1/10/2024 8:35 am      INDICATION:  Signs/Symptoms:screening test,  htn.      COMPARISON:  None.      ACCESSION NUMBER(S):  [id number]      ORDERING CLINICIAN:  ROBBY SOL      TECHNIQUE:  Using prospective ECG gating, CT scan of the coronary arteries was  performed without intravenous contrast. Coronary calcium scoring  was  performed according to the method of Agatston.      FINDINGS:  The score and distribution of calcium in the coronary arteries is as  follows:      LM 0  LAD 0  LCx 0  RCA 0      Total 0      The " "visualized mid/lower ascending thoracic aorta measures 3.96 cm in  diameter. The heart is normal in size. No pericardial effusion is  present.      No gross evidence of mediastinal or hilar lymphadenopathy or masses  is identified. The visualized segments of the lungs are normally  expanded.      The visualized subdiaphragmatic structures appear intact.      Impression: 1. Coronary artery calcium score of 0*.      *Coronary artery calcium scoring may be helpful in predicting the  risk for future coronary heart disease events.  According to the  American College of Cardiology Foundation Clinical Expert Consensus  Task Force, such testing provides important prognostic information in  patients with more than one coronary heart disease risk factor. The  coronary artery calcium score correlates with the annual risk of a  non-fatal myocardial infarction or coronary heart disease death.      Coronary artery score            Annual Risk      0-99                             0.4%  100-399                        1.3%  >400                            2.4%      These three \"breakpoints\" correspond to lower, intermediate and high  risk states for future coronary events.  Such information should be  used, along with appropriate clinical judgment, to make decisions  regarding the intensity of risk factor management strategies to treat  blood lipids and to modify other non-lipid coronary risk factors.      Reference: [place] P et al. Circulation.  2007; 115:402-426      MACRO:  None      Signed by: Bhupinder Graham 1/10/2024 12:14 PM  Dictation workstation:   [id number]       Current Outpatient Medications   Medication Sig Dispense Refill    glucosamine-chondroitin 500-400 mg tablet Take 1 tablet by mouth early in the morning..      levothyroxine (Synthroid, Levoxyl) 137 mcg tablet Take 1 tablet (137 mcg) by mouth once daily. 90 tablet 4    multivitamin tablet Take 1 tablet by mouth once daily.      omega 3-dha-epa-fish oil " (Fish OiL) 1,000 mg (120 mg-180 mg) capsule Take by mouth.      valsartan (Diovan) 160 mg tablet Take 1 tablet (160 mg) by mouth once daily. For blood pressure 90 tablet 4     No current facility-administered medications for this visit.            FAMILY HISTORY:   Family History   Problem Relation Name Age of Onset    Goiter Mother          Total Thyroidectomy    Pancreatic cancer Mother      Other (Cigarette use) Father      Heart attack Father      Thyroid cancer Sister      Heart attack Brother      Other (cabg) Brother          ALLERGY: Grass pollen    SOCIAL HISTORY: He  reports current alcohol use of about 1.0 standard drink of alcohol per week. He  reports no history of drug use.            Medication reviewed in e-chart  Patient is monitored for medication toxicity  labs reviewed and interpreted independently, X rays independently reviewed  Notes from other physicians involved in care were reviewed    Charting was completed using voice recognition technology and may include unintended errors.    IVETTE ARREOLA MD, ADENIKE.    [facility] Master Clinician in Hematology and Oncology  Medical Director, [facility].  [place] office  Phone [phone]  Fax      [phone]  [facility].  Phone [phone]  Fax     [phone]

## 2025-10-28 ENCOUNTER — APPOINTMENT (OUTPATIENT)
Dept: PRIMARY CARE | Facility: CLINIC | Age: 65
End: 2025-10-28
Payer: COMMERCIAL